# Patient Record
Sex: FEMALE | Race: BLACK OR AFRICAN AMERICAN | HISPANIC OR LATINO | URBAN - METROPOLITAN AREA
[De-identification: names, ages, dates, MRNs, and addresses within clinical notes are randomized per-mention and may not be internally consistent; named-entity substitution may affect disease eponyms.]

---

## 2018-09-08 ENCOUNTER — EMERGENCY (EMERGENCY)
Facility: HOSPITAL | Age: 23
LOS: 0 days | Discharge: HOME | End: 2018-09-08
Admitting: PHYSICIAN ASSISTANT

## 2018-09-08 VITALS
TEMPERATURE: 98 F | RESPIRATION RATE: 18 BRPM | OXYGEN SATURATION: 99 % | HEART RATE: 68 BPM | SYSTOLIC BLOOD PRESSURE: 122 MMHG | DIASTOLIC BLOOD PRESSURE: 79 MMHG

## 2018-09-08 DIAGNOSIS — J02.9 ACUTE PHARYNGITIS, UNSPECIFIED: ICD-10-CM

## 2018-09-08 DIAGNOSIS — F17.200 NICOTINE DEPENDENCE, UNSPECIFIED, UNCOMPLICATED: ICD-10-CM

## 2018-09-08 RX ORDER — IBUPROFEN 200 MG
1 TABLET ORAL
Qty: 15 | Refills: 0
Start: 2018-09-08 | End: 2018-09-12

## 2018-09-08 RX ORDER — IBUPROFEN 200 MG
600 TABLET ORAL ONCE
Qty: 0 | Refills: 0 | Status: COMPLETED | OUTPATIENT
Start: 2018-09-08 | End: 2018-09-08

## 2018-09-08 RX ADMIN — Medication 600 MILLIGRAM(S): at 15:38

## 2018-09-08 NOTE — ED PROVIDER NOTE - OBJECTIVE STATEMENT
3 days of cough, nasal congestion, subjective fever, chills. no SOB. Positive smoking. No recent travel or leg pain or OCP use

## 2019-01-24 PROBLEM — Z00.00 ENCOUNTER FOR PREVENTIVE HEALTH EXAMINATION: Status: ACTIVE | Noted: 2019-01-24

## 2019-01-29 ENCOUNTER — APPOINTMENT (OUTPATIENT)
Dept: ANTEPARTUM | Facility: CLINIC | Age: 24
End: 2019-01-29

## 2019-01-29 ENCOUNTER — OUTPATIENT (OUTPATIENT)
Dept: OUTPATIENT SERVICES | Facility: HOSPITAL | Age: 24
LOS: 1 days | Discharge: HOME | End: 2019-01-29

## 2019-01-29 DIAGNOSIS — O35.1XX1 MATERNAL CARE FOR (SUSPECTED) CHROMOSOMAL ABNORMALITY IN FETUS, FETUS 1: ICD-10-CM

## 2019-01-29 DIAGNOSIS — Z36.82 ENCOUNTER FOR ANTENATAL SCREENING FOR NUCHAL TRANSLUCENCY: ICD-10-CM

## 2019-01-29 DIAGNOSIS — Z3A.12 12 WEEKS GESTATION OF PREGNANCY: ICD-10-CM

## 2019-03-01 ENCOUNTER — EMERGENCY (EMERGENCY)
Facility: HOSPITAL | Age: 24
LOS: 0 days | Discharge: HOME | End: 2019-03-01
Attending: EMERGENCY MEDICINE | Admitting: EMERGENCY MEDICINE

## 2019-03-01 VITALS
HEART RATE: 86 BPM | RESPIRATION RATE: 18 BRPM | SYSTOLIC BLOOD PRESSURE: 116 MMHG | TEMPERATURE: 98 F | OXYGEN SATURATION: 98 % | DIASTOLIC BLOOD PRESSURE: 62 MMHG

## 2019-03-01 VITALS
TEMPERATURE: 99 F | DIASTOLIC BLOOD PRESSURE: 53 MMHG | OXYGEN SATURATION: 98 % | HEART RATE: 85 BPM | RESPIRATION RATE: 18 BRPM | SYSTOLIC BLOOD PRESSURE: 103 MMHG

## 2019-03-01 DIAGNOSIS — Z79.2 LONG TERM (CURRENT) USE OF ANTIBIOTICS: ICD-10-CM

## 2019-03-01 DIAGNOSIS — R11.2 NAUSEA WITH VOMITING, UNSPECIFIED: ICD-10-CM

## 2019-03-01 DIAGNOSIS — R50.9 FEVER, UNSPECIFIED: ICD-10-CM

## 2019-03-01 DIAGNOSIS — Z3A.17 17 WEEKS GESTATION OF PREGNANCY: ICD-10-CM

## 2019-03-01 DIAGNOSIS — R10.9 UNSPECIFIED ABDOMINAL PAIN: ICD-10-CM

## 2019-03-01 DIAGNOSIS — Z79.899 OTHER LONG TERM (CURRENT) DRUG THERAPY: ICD-10-CM

## 2019-03-01 DIAGNOSIS — O99.89 OTHER SPECIFIED DISEASES AND CONDITIONS COMPLICATING PREGNANCY, CHILDBIRTH AND THE PUERPERIUM: ICD-10-CM

## 2019-03-01 DIAGNOSIS — R19.7 DIARRHEA, UNSPECIFIED: ICD-10-CM

## 2019-03-01 LAB
ALBUMIN SERPL ELPH-MCNC: 3.9 G/DL — SIGNIFICANT CHANGE UP (ref 3.5–5.2)
ALP SERPL-CCNC: 57 U/L — SIGNIFICANT CHANGE UP (ref 30–115)
ALT FLD-CCNC: 14 U/L — SIGNIFICANT CHANGE UP (ref 0–41)
ANION GAP SERPL CALC-SCNC: 15 MMOL/L — HIGH (ref 7–14)
APPEARANCE UR: ABNORMAL
AST SERPL-CCNC: 18 U/L — SIGNIFICANT CHANGE UP (ref 0–41)
BACTERIA # UR AUTO: ABNORMAL /HPF
BASOPHILS # BLD AUTO: 0.02 K/UL — SIGNIFICANT CHANGE UP (ref 0–0.2)
BASOPHILS NFR BLD AUTO: 0.3 % — SIGNIFICANT CHANGE UP (ref 0–1)
BILIRUB SERPL-MCNC: 0.3 MG/DL — SIGNIFICANT CHANGE UP (ref 0.2–1.2)
BILIRUB UR-MCNC: ABNORMAL
BUN SERPL-MCNC: 7 MG/DL — LOW (ref 10–20)
CALCIUM SERPL-MCNC: 9.1 MG/DL — SIGNIFICANT CHANGE UP (ref 8.5–10.1)
CHLORIDE SERPL-SCNC: 98 MMOL/L — SIGNIFICANT CHANGE UP (ref 98–110)
CO2 SERPL-SCNC: 21 MMOL/L — SIGNIFICANT CHANGE UP (ref 17–32)
COLOR SPEC: SIGNIFICANT CHANGE UP
CREAT SERPL-MCNC: 0.6 MG/DL — LOW (ref 0.7–1.5)
DIFF PNL FLD: NEGATIVE — SIGNIFICANT CHANGE UP
EOSINOPHIL # BLD AUTO: 0 K/UL — SIGNIFICANT CHANGE UP (ref 0–0.7)
EOSINOPHIL NFR BLD AUTO: 0 % — SIGNIFICANT CHANGE UP (ref 0–8)
GLUCOSE SERPL-MCNC: 96 MG/DL — SIGNIFICANT CHANGE UP (ref 70–99)
GLUCOSE UR QL: NEGATIVE — SIGNIFICANT CHANGE UP
HCT VFR BLD CALC: 31.1 % — LOW (ref 37–47)
HGB BLD-MCNC: 10.6 G/DL — LOW (ref 12–16)
IMM GRANULOCYTES NFR BLD AUTO: 0.3 % — SIGNIFICANT CHANGE UP (ref 0.1–0.3)
KETONES UR-MCNC: >=80
LEUKOCYTE ESTERASE UR-ACNC: ABNORMAL
LYMPHOCYTES # BLD AUTO: 0.91 K/UL — LOW (ref 1.2–3.4)
LYMPHOCYTES # BLD AUTO: 15.7 % — LOW (ref 20.5–51.1)
MCHC RBC-ENTMCNC: 29.4 PG — SIGNIFICANT CHANGE UP (ref 27–31)
MCHC RBC-ENTMCNC: 34.1 G/DL — SIGNIFICANT CHANGE UP (ref 32–37)
MCV RBC AUTO: 86.4 FL — SIGNIFICANT CHANGE UP (ref 81–99)
MONOCYTES # BLD AUTO: 0.6 K/UL — SIGNIFICANT CHANGE UP (ref 0.1–0.6)
MONOCYTES NFR BLD AUTO: 10.3 % — HIGH (ref 1.7–9.3)
NEUTROPHILS # BLD AUTO: 4.25 K/UL — SIGNIFICANT CHANGE UP (ref 1.4–6.5)
NEUTROPHILS NFR BLD AUTO: 73.4 % — SIGNIFICANT CHANGE UP (ref 42.2–75.2)
NITRITE UR-MCNC: NEGATIVE — SIGNIFICANT CHANGE UP
NRBC # BLD: 0 /100 WBCS — SIGNIFICANT CHANGE UP (ref 0–0)
PH UR: 6.5 — SIGNIFICANT CHANGE UP (ref 5–8)
PLATELET # BLD AUTO: 229 K/UL — SIGNIFICANT CHANGE UP (ref 130–400)
POTASSIUM SERPL-MCNC: 3.5 MMOL/L — SIGNIFICANT CHANGE UP (ref 3.5–5)
POTASSIUM SERPL-SCNC: 3.5 MMOL/L — SIGNIFICANT CHANGE UP (ref 3.5–5)
PROT SERPL-MCNC: 7.9 G/DL — SIGNIFICANT CHANGE UP (ref 6–8)
PROT UR-MCNC: 100
RBC # BLD: 3.6 M/UL — LOW (ref 4.2–5.4)
RBC # FLD: 12.3 % — SIGNIFICANT CHANGE UP (ref 11.5–14.5)
SODIUM SERPL-SCNC: 134 MMOL/L — LOW (ref 135–146)
SP GR SPEC: >=1.03 — SIGNIFICANT CHANGE UP (ref 1.01–1.03)
UROBILINOGEN FLD QL: 1 (ref 0.2–0.2)
WBC # BLD: 5.8 K/UL — SIGNIFICANT CHANGE UP (ref 4.8–10.8)
WBC # FLD AUTO: 5.8 K/UL — SIGNIFICANT CHANGE UP (ref 4.8–10.8)
WBC UR QL: SIGNIFICANT CHANGE UP /HPF

## 2019-03-01 RX ORDER — ACETAMINOPHEN 500 MG
650 TABLET ORAL ONCE
Qty: 0 | Refills: 0 | Status: COMPLETED | OUTPATIENT
Start: 2019-03-01 | End: 2019-03-01

## 2019-03-01 RX ORDER — ONDANSETRON 8 MG/1
4 TABLET, FILM COATED ORAL ONCE
Qty: 0 | Refills: 0 | Status: COMPLETED | OUTPATIENT
Start: 2019-03-01 | End: 2019-03-01

## 2019-03-01 RX ORDER — SODIUM CHLORIDE 9 MG/ML
2000 INJECTION, SOLUTION INTRAVENOUS ONCE
Qty: 0 | Refills: 0 | Status: COMPLETED | OUTPATIENT
Start: 2019-03-01 | End: 2019-03-01

## 2019-03-01 RX ADMIN — Medication 650 MILLIGRAM(S): at 21:00

## 2019-03-01 RX ADMIN — ONDANSETRON 4 MILLIGRAM(S): 8 TABLET, FILM COATED ORAL at 18:17

## 2019-03-01 RX ADMIN — SODIUM CHLORIDE 2000 MILLILITER(S): 9 INJECTION, SOLUTION INTRAVENOUS at 18:17

## 2019-03-01 RX ADMIN — SODIUM CHLORIDE 2000 MILLILITER(S): 9 INJECTION, SOLUTION INTRAVENOUS at 19:30

## 2019-03-01 RX ADMIN — Medication 650 MILLIGRAM(S): at 20:19

## 2019-03-01 NOTE — ED PROVIDER NOTE - ATTENDING CONTRIBUTION TO CARE
I personally evaluated the patient. I reviewed the Resident’s or Physician Assistant’s note (as assigned above), and agree with the findings and plan except as documented in my note.  22 yo woman with myalgias, nasuea, vomiting and flank pain.  Workup in ED unremarkable.  Improved with IVf and medications. sTable for discharge and close outpatient follow up.

## 2019-03-01 NOTE — ED ADULT NURSE NOTE - NSIMPLEMENTINTERV_GEN_ALL_ED
Implemented All Universal Safety Interventions:  [place] to call system. Call bell, personal items and telephone within reach. Instruct patient to call for assistance. Room bathroom lighting operational. Non-slip footwear when patient is off stretcher. Physically safe environment: no spills, clutter or unnecessary equipment. Stretcher in lowest position, wheels locked, appropriate side rails in place.

## 2019-03-01 NOTE — ED PROVIDER NOTE - CLINICAL SUMMARY MEDICAL DECISION MAKING FREE TEXT BOX
24 yo woman with nausea vomitign and diarrhea.  Very well appearing.  Normal exam.  Labs ok.  Stable for discharge.

## 2019-03-01 NOTE — ED PROVIDER NOTE - PHYSICAL EXAMINATION
Con: Well appearing NAD non toxic.   HEENT: NCAT EOMI conjunctiva normal. No nasal discharge. dry MM  Neck: Supple, non tender, full ROM.   CV: RRR no MRG +S1S2.   Pulm: CTA b/l.   Abd: s NT ND +BS.   +R CVA tenderness  Ext: WWP x4, moving all extremities, no edema.   Psy: Cooperative, appropriate.   Skin: Warm, dry, no rash
normal

## 2019-03-01 NOTE — ED PROVIDER NOTE - NS ED ROS FT
Constitutional:  See HPI.   Eyes:  No visual changes, eye pain or discharge.  ENMT:  No hearing changes, pain, discharge or infections. No neck pain or stiffness.  Cardiac:  No chest pain, SOB or edema. No chest pain with exertion.  Respiratory:  No cough or respiratory distress. No hemoptysis.  GI:  No abdominal pain.  :  No dysuria, frequency, hematuria  MS:  No joint pain    Neuro:  No LOC. No headache or weakness.    Skin:  No skin rash.  Except as in HPI, all other review of systems is negative

## 2019-03-01 NOTE — ED PROVIDER NOTE - PROGRESS NOTE DETAILS
pt feeling improved, tolerating PO. Patient aware of all results, given a copy of all results, comfortable with discharge and follow-up outpatient, strict return precautions given. Patient endorses understanding of all of this and aware that they can return at any time for new or concerning symptoms. No further questions or concerns at this time

## 2019-03-01 NOTE — ED PROVIDER NOTE - NSFOLLOWUPINSTRUCTIONS_ED_ALL_ED_FT
Viral Gastroenteritis, Adult    Viral gastroenteritis is also known as the stomach flu. This condition is caused by certain germs (viruses). These germs can be passed from person to person very easily (are very contagious). This condition can cause sudden watery poop (diarrhea), fever, and throwing up (vomiting).    Having watery poop and throwing up can make you feel weak and cause you to get dehydrated. Dehydration can make you tired and thirsty, make you have a dry mouth, and make it so you pee (urinate) less often. Older adults and people with other diseases or a weak defense system (immune system) are at higher risk for dehydration. It is important to replace the fluids that you lose from having watery poop and throwing up.    HOME CARE  Follow instructions from your doctor about how to care for yourself at home.    Eating and Drinking    Follow these instructions as told by your doctor:    Take an oral rehydration solution (ORS). This is a drink that is sold at pharmacies and stores.   Drink clear fluids in small amounts as you are able, such as:  Water.  Ice chips.  Diluted fruit juice.  Low-calorie sports drinks.  Eat bland, easy-to-digest foods in small amounts as you are able, such as:  Bananas.  Applesauce.  Rice.  Low-fat (lean) meats.  Toast.  Crackers.  Avoid fluids that have a lot of sugar or caffeine in them, such as:  Energy drinks.  Sports drinks.  Soda.  Avoid alcohol.  Avoid spicy or fatty foods.    General Instructions    Drink enough fluid to keep your pee (urine) clear or pale yellow.  Wash your hands often. If you cannot use soap and water, use hand .  Make sure that all people in your home wash their hands well and often.  Rest at home while you get better.  Take over-the-counter and prescription medicines only as told by your doctor.  Watch your condition for any changes.  Take a warm bath to help with any burning or pain from having watery poop.  Keep all follow-up visits as told by your doctor. This is important.    GET HELP IF:  You cannot keep fluids down.  Your symptoms get worse.  You have new symptoms.  You feel light-headed or dizzy.  You have muscle cramps.    GET HELP RIGHT AWAY IF:  You have chest pain.  You feel very weak or you pass out (faint).  You see blood in your throw-up.  Your throw-up looks like coffee grounds.  You have bloody or black poop (stools) or poop that look like tar.  You have a very bad headache, a stiff neck, or both.  You have a rash.  You have very bad pain, cramping, or bloating in your belly (abdomen).  You have trouble breathing.  You are breathing very quickly.  Your heart is beating very quickly.  Your skin feels cold and clammy.  You feel confused.  You have pain when you pee.  You have signs of dehydration, such as:  Dark pee, hardly any pee, or no pee.  Cracked lips.  Dry mouth.  Sunken eyes.  Sleepiness.  Weakness.    ADDITIONAL NOTES AND INSTRUCTIONS    Please follow up with your Primary MD in 24-48 hr.  Seek immediate medical care for any new/worsening signs or symptoms.     Viral Respiratory Infection    A viral respiratory infection is an illness that affects parts of the body used for breathing, like the lungs, nose, and throat. It is caused by a germ called a virus. Symptoms can include runny nose, coughing, sneezing, fatigue, body aches, sore throat, fever, or headache. Over the counter medicine can be used to manage the symptoms but the infection itself goes away on its own.     SEEK IMMEDIATE MEDICAL CARE IF YOU HAVE THE FOLLOWING SYMPTOMS: shortness of breath, chest pain, fever over 10 days, lightheadedness/dizziness.

## 2019-03-03 LAB
CULTURE RESULTS: SIGNIFICANT CHANGE UP
SPECIMEN SOURCE: SIGNIFICANT CHANGE UP

## 2019-03-04 ENCOUNTER — OUTPATIENT (OUTPATIENT)
Dept: OUTPATIENT SERVICES | Facility: HOSPITAL | Age: 24
LOS: 1 days | Discharge: HOME | End: 2019-03-04

## 2019-03-04 DIAGNOSIS — Z00.00 ENCOUNTER FOR GENERAL ADULT MEDICAL EXAMINATION WITHOUT ABNORMAL FINDINGS: ICD-10-CM

## 2019-03-19 ENCOUNTER — OUTPATIENT (OUTPATIENT)
Dept: OUTPATIENT SERVICES | Facility: HOSPITAL | Age: 24
LOS: 1 days | Discharge: HOME | End: 2019-03-19

## 2019-03-19 ENCOUNTER — APPOINTMENT (OUTPATIENT)
Dept: ANTEPARTUM | Facility: CLINIC | Age: 24
End: 2019-03-19

## 2019-04-02 ENCOUNTER — OUTPATIENT (OUTPATIENT)
Dept: OUTPATIENT SERVICES | Facility: HOSPITAL | Age: 24
LOS: 1 days | Discharge: HOME | End: 2019-04-02

## 2019-04-02 ENCOUNTER — APPOINTMENT (OUTPATIENT)
Dept: ANTEPARTUM | Facility: CLINIC | Age: 24
End: 2019-04-02

## 2021-04-13 ENCOUNTER — ASOB RESULT (OUTPATIENT)
Age: 26
End: 2021-04-13

## 2021-04-13 ENCOUNTER — APPOINTMENT (OUTPATIENT)
Dept: ANTEPARTUM | Facility: CLINIC | Age: 26
End: 2021-04-13
Payer: MEDICAID

## 2021-04-13 ENCOUNTER — OUTPATIENT (OUTPATIENT)
Dept: OUTPATIENT SERVICES | Facility: HOSPITAL | Age: 26
LOS: 1 days | Discharge: HOME | End: 2021-04-13

## 2021-04-13 ENCOUNTER — APPOINTMENT (OUTPATIENT)
Dept: OBGYN | Facility: CLINIC | Age: 26
End: 2021-04-13

## 2021-04-13 PROCEDURE — 76819 FETAL BIOPHYS PROFIL W/O NST: CPT | Mod: 26

## 2021-04-13 PROCEDURE — 76805 OB US >/= 14 WKS SNGL FETUS: CPT | Mod: 26

## 2021-05-03 ENCOUNTER — OUTPATIENT (OUTPATIENT)
Dept: OUTPATIENT SERVICES | Facility: HOSPITAL | Age: 26
LOS: 1 days | Discharge: HOME | End: 2021-05-03
Payer: MEDICAID

## 2021-05-03 VITALS — HEART RATE: 85 BPM | DIASTOLIC BLOOD PRESSURE: 76 MMHG | SYSTOLIC BLOOD PRESSURE: 118 MMHG

## 2021-05-03 VITALS
SYSTOLIC BLOOD PRESSURE: 118 MMHG | HEART RATE: 85 BPM | DIASTOLIC BLOOD PRESSURE: 76 MMHG | TEMPERATURE: 98 F | RESPIRATION RATE: 16 BRPM

## 2021-05-03 DIAGNOSIS — O36.93X0 MATERNAL CARE FOR FETAL PROBLEM, UNSPECIFIED, THIRD TRIMESTER, NOT APPLICABLE OR UNSPECIFIED: ICD-10-CM

## 2021-05-03 DIAGNOSIS — O26.893 OTHER SPECIFIED PREGNANCY RELATED CONDITIONS, THIRD TRIMESTER: ICD-10-CM

## 2021-05-03 DIAGNOSIS — Z90.49 ACQUIRED ABSENCE OF OTHER SPECIFIED PARTS OF DIGESTIVE TRACT: Chronic | ICD-10-CM

## 2021-05-03 PROCEDURE — 99213 OFFICE O/P EST LOW 20 MIN: CPT | Mod: 25

## 2021-05-03 PROCEDURE — 76815 OB US LIMITED FETUS(S): CPT | Mod: 26

## 2021-05-03 PROCEDURE — 76818 FETAL BIOPHYS PROFILE W/NST: CPT | Mod: 26

## 2021-05-03 RX ORDER — ONDANSETRON 8 MG/1
4 TABLET, FILM COATED ORAL ONCE
Refills: 0 | Status: COMPLETED | OUTPATIENT
Start: 2021-05-03 | End: 2021-05-03

## 2021-05-03 RX ORDER — AMPICILLIN TRIHYDRATE 250 MG
2 CAPSULE ORAL ONCE
Refills: 0 | Status: COMPLETED | OUTPATIENT
Start: 2021-05-03 | End: 2021-05-03

## 2021-05-03 RX ADMIN — ONDANSETRON 4 MILLIGRAM(S): 8 TABLET, FILM COATED ORAL at 13:10

## 2021-05-03 RX ADMIN — Medication 12 MILLIGRAM(S): at 13:02

## 2021-05-03 RX ADMIN — Medication 216 GRAM(S): at 13:01

## 2021-05-03 NOTE — OB PROVIDER TRIAGE NOTE - HISTORY OF PRESENT ILLNESS
26yo  @35w4d by 3rd trimester sonogram, late transfer with limited prenatal care, presented to L&D after feeling leakage of fluid at 0900 today. She reports brown mucus discharge and leakage of clear fluid that soaked a towel that she had between her legs. Denies vaginal bleeding. Reports good fetal movement. Denies complications during this pregnancy. GBS unknown.  26yo  @35w4d by 3rd trimester sonogram, late transfer with limited prenatal care, presented to L&D after feeling leakage of fluid at 0900 today. She reports brown mucus discharge and leakage of clear fluid that soaked a towel that she had between her legs. Contractions began at 0900, felt every 3 min, 8/10 in intensity. Denies vaginal bleeding. Reports good fetal movement. Denies complications during this pregnancy. GBS unknown.  24yo  @35w4d by 3rd trimester sonogram, late registrant with limited prenatal care, presented to L&D after feeling leakage of fluid at 0900 today. She reports brown mucus discharge and leakage of clear fluid that soaked a towel that she had between her legs. Contractions began at 0900, felt every 3 min, 8/10 in intensity. Denies vaginal bleeding. Reports good fetal movement. Denies complications during this pregnancy. GBS unknown.

## 2021-05-03 NOTE — OB PROVIDER TRIAGE NOTE - ADDITIONAL INSTRUCTIONS
Pt to ambulate x 2-3 hours and return to L&D for reevaluation  Increase fluid intake  Dr. Zayas aware rule out  labor

## 2021-05-03 NOTE — OB PROVIDER TRIAGE NOTE - NSHPPHYSICALEXAM_GEN_ALL_CORE
T(C): 36.7 (05-03-21 @ 10:29), Max: 36.7 (05-03-21 @ 10:29)  T(F): 98.1 (05-03-21 @ 10:29), Max: 98.1 (05-03-21 @ 10:29)  HR: 85 (05-03-21 @ 10:33) (85 - 85)  BP: 118/76 (05-03-21 @ 10:33) (118/76 - 118/76)  RR: 16 (05-03-21 @ 10:29) (16 - 16)    Gen: AOx3, NAD  Abd: soft, gravid, nontender, no palpable contractions  Ext: no swelling  Speculum: no pooling, nitrazine positive, ferning negative  Bedside sono: cephalic, MVP 4.26cm, BPP 8/8, anterior placenta    EFM: 125/mod angela/+accels   [place]: irregular  SVE: 2/50/-3/vtx

## 2021-05-03 NOTE — OB PROVIDER TRIAGE NOTE - NS_OBGYNHISTORY_OBGYN_ALL_OB_FT
OB Hx:     x6 all delivered at around 36w, largest approx 5lbs, denies complications  36w LTCS x1 (second pregnancy) for NRFHR  eTOPx1, no D&C    GYN Hx:  Denies h/o ovarian cysts, fibroids, STIs, or abnormal pap smears.

## 2021-05-03 NOTE — OB PROVIDER TRIAGE NOTE - PLAN OF CARE
Healthy pregnancy Halthy pregnancy Discharge to home  Pt to return in 2-3hours for reevaluation  Return sooner if pain worsens, Loss of fluid or VB  Increase fluid intake  Dr. Stoner/ Dr. Zayas aware

## 2021-05-03 NOTE — OB PROVIDER TRIAGE NOTE - NSHPLABSRESULTS_GEN_ALL_CORE
No labs available at this time     Sonograms:  4/13/21: EGA 32w5d, FOREST 6/3/21, cephalic, anterior placenta, MVP 3.86cm, BPP 8/8, EFW 1907g (23%), fetal anatomy incompletely visualized due to advanced gestational age and fetal position, however, no major malformations visualized.

## 2021-05-03 NOTE — OB PROVIDER TRIAGE NOTE - NSOBPROVIDERNOTE_OBGYN_ALL_OB_FT
24yo  @35w4d by 3rd trimester sono, late transfer with limited prenatal care, membranes intact and not in labor.  - PO hydration encouraged  - f/u GBS  - f/u outpatient  - labor precautions      and  aware 26yo  @35w4d by 3rd trimester sono, late transfer with limited prenatal care, here for r/o rupture of membranes.   - PO hydration encouraged  - f/u GBS  - labor precautions      and  aware 24yo  @35w4d by 3rd trimester sono, late transfer with limited prenatal care, here for r/o rupture of membranes.   - PO hydration encouraged  - f/u GBS  - labor precautions      and  aware    Attending note:  patient seen and examined  35 weeks for r/o rupture; membranes intact and pt not in labor  membranes confirmed intact; negative ferning, membranes palpated on exam, adequate MVP  exam 2/50/-3, intact  EFM reactive  betamethasone given for  contractions, risk of  delivery given history of  deliveries  precautions given to return 24yo  @35w4d by 3rd trimester sono, late registrant with limited prenatal care, here for r/o rupture of membranes.   - PO hydration encouraged  - f/u GBS  - labor precautions      and  aware    Attending note:  patient seen and examined  35 weeks for r/o rupture; membranes intact and pt not in labor  membranes confirmed intact; negative ferning, membranes palpated on exam, adequate MVP  exam 2/50/-3, intact  EFM reactive  betamethasone given for  contractions, risk of  delivery given history of  deliveries  precautions given to return

## 2021-05-04 ENCOUNTER — OUTPATIENT (OUTPATIENT)
Dept: OUTPATIENT SERVICES | Facility: HOSPITAL | Age: 26
LOS: 1 days | Discharge: HOME | End: 2021-05-04
Payer: MEDICAID

## 2021-05-04 VITALS — SYSTOLIC BLOOD PRESSURE: 122 MMHG | DIASTOLIC BLOOD PRESSURE: 68 MMHG | HEART RATE: 71 BPM

## 2021-05-04 VITALS — SYSTOLIC BLOOD PRESSURE: 120 MMHG | DIASTOLIC BLOOD PRESSURE: 63 MMHG | HEART RATE: 78 BPM

## 2021-05-04 DIAGNOSIS — Z90.49 ACQUIRED ABSENCE OF OTHER SPECIFIED PARTS OF DIGESTIVE TRACT: Chronic | ICD-10-CM

## 2021-05-04 PROBLEM — D64.9 ANEMIA, UNSPECIFIED: Chronic | Status: ACTIVE | Noted: 2021-05-03

## 2021-05-04 LAB
AMPHET UR-MCNC: NEGATIVE — SIGNIFICANT CHANGE UP
APPEARANCE UR: ABNORMAL
BACTERIA # UR AUTO: NEGATIVE — SIGNIFICANT CHANGE UP
BARBITURATES UR SCN-MCNC: NEGATIVE — SIGNIFICANT CHANGE UP
BASOPHILS # BLD AUTO: 0.01 K/UL — SIGNIFICANT CHANGE UP (ref 0–0.2)
BASOPHILS NFR BLD AUTO: 0.1 % — SIGNIFICANT CHANGE UP (ref 0–1)
BENZODIAZ UR-MCNC: NEGATIVE — SIGNIFICANT CHANGE UP
BILIRUB UR-MCNC: NEGATIVE — SIGNIFICANT CHANGE UP
BLD GP AB SCN SERPL QL: SIGNIFICANT CHANGE UP
BUPRENORPHINE SCREEN, URINE RESULT: NEGATIVE — SIGNIFICANT CHANGE UP
COCAINE METAB.OTHER UR-MCNC: NEGATIVE — SIGNIFICANT CHANGE UP
COLOR SPEC: YELLOW — SIGNIFICANT CHANGE UP
DIFF PNL FLD: NEGATIVE — SIGNIFICANT CHANGE UP
EOSINOPHIL # BLD AUTO: 0 K/UL — SIGNIFICANT CHANGE UP (ref 0–0.7)
EOSINOPHIL NFR BLD AUTO: 0 % — SIGNIFICANT CHANGE UP (ref 0–8)
EPI CELLS # UR: 11 /HPF — HIGH (ref 0–5)
FENTANYL UR QL: NEGATIVE — SIGNIFICANT CHANGE UP
GLUCOSE UR QL: NEGATIVE — SIGNIFICANT CHANGE UP
HCT VFR BLD CALC: 30.3 % — LOW (ref 37–47)
HGB BLD-MCNC: 10.4 G/DL — LOW (ref 12–16)
HYALINE CASTS # UR AUTO: 14 /LPF — HIGH (ref 0–7)
IMM GRANULOCYTES NFR BLD AUTO: 0.6 % — HIGH (ref 0.1–0.3)
KETONES UR-MCNC: ABNORMAL
L&D DRUG SCREEN, URINE: SIGNIFICANT CHANGE UP
LEUKOCYTE ESTERASE UR-ACNC: ABNORMAL
LYMPHOCYTES # BLD AUTO: 19.9 % — LOW (ref 20.5–51.1)
LYMPHOCYTES # BLD AUTO: 2.01 K/UL — SIGNIFICANT CHANGE UP (ref 1.2–3.4)
MCHC RBC-ENTMCNC: 30.2 PG — SIGNIFICANT CHANGE UP (ref 27–31)
MCHC RBC-ENTMCNC: 34.3 G/DL — SIGNIFICANT CHANGE UP (ref 32–37)
MCV RBC AUTO: 88.1 FL — SIGNIFICANT CHANGE UP (ref 81–99)
METHADONE UR-MCNC: NEGATIVE — SIGNIFICANT CHANGE UP
MONOCYTES # BLD AUTO: 0.47 K/UL — SIGNIFICANT CHANGE UP (ref 0.1–0.6)
MONOCYTES NFR BLD AUTO: 4.7 % — SIGNIFICANT CHANGE UP (ref 1.7–9.3)
NEUTROPHILS # BLD AUTO: 7.54 K/UL — HIGH (ref 1.4–6.5)
NEUTROPHILS NFR BLD AUTO: 74.7 % — SIGNIFICANT CHANGE UP (ref 42.2–75.2)
NITRITE UR-MCNC: NEGATIVE — SIGNIFICANT CHANGE UP
NRBC # BLD: 0 /100 WBCS — SIGNIFICANT CHANGE UP (ref 0–0)
OPIATES UR-MCNC: NEGATIVE — SIGNIFICANT CHANGE UP
OXYCODONE UR-MCNC: NEGATIVE — SIGNIFICANT CHANGE UP
PCP UR-MCNC: NEGATIVE — SIGNIFICANT CHANGE UP
PH UR: 6.5 — SIGNIFICANT CHANGE UP (ref 5–8)
PLATELET # BLD AUTO: 214 K/UL — SIGNIFICANT CHANGE UP (ref 130–400)
PRENATAL SYPHILIS TEST: SIGNIFICANT CHANGE UP
PROPOXYPHENE QUALITATIVE URINE RESULT: NEGATIVE — SIGNIFICANT CHANGE UP
PROT UR-MCNC: ABNORMAL
RBC # BLD: 3.44 M/UL — LOW (ref 4.2–5.4)
RBC # FLD: 12.1 % — SIGNIFICANT CHANGE UP (ref 11.5–14.5)
RBC CASTS # UR COMP ASSIST: 3 /HPF — SIGNIFICANT CHANGE UP (ref 0–4)
SARS-COV-2 RNA SPEC QL NAA+PROBE: SIGNIFICANT CHANGE UP
SP GR SPEC: 1.02 — SIGNIFICANT CHANGE UP (ref 1.01–1.03)
UROBILINOGEN FLD QL: ABNORMAL
WBC # BLD: 10.09 K/UL — SIGNIFICANT CHANGE UP (ref 4.8–10.8)
WBC # FLD AUTO: 10.09 K/UL — SIGNIFICANT CHANGE UP (ref 4.8–10.8)
WBC UR QL: 16 /HPF — HIGH (ref 0–5)

## 2021-05-04 PROCEDURE — 76815 OB US LIMITED FETUS(S): CPT | Mod: 26

## 2021-05-04 PROCEDURE — 99212 OFFICE O/P EST SF 10 MIN: CPT | Mod: 25

## 2021-05-04 PROCEDURE — 76818 FETAL BIOPHYS PROFILE W/NST: CPT | Mod: 26

## 2021-05-04 RX ORDER — AMPICILLIN TRIHYDRATE 250 MG
1 CAPSULE ORAL EVERY 4 HOURS
Refills: 0 | Status: DISCONTINUED | OUTPATIENT
Start: 2021-05-04 | End: 2021-05-18

## 2021-05-04 RX ORDER — FAMOTIDINE 10 MG/ML
20 INJECTION INTRAVENOUS DAILY
Refills: 0 | Status: DISCONTINUED | OUTPATIENT
Start: 2021-05-04 | End: 2021-05-18

## 2021-05-04 RX ORDER — FAMOTIDINE 10 MG/ML
20 INJECTION INTRAVENOUS ONCE
Refills: 0 | Status: COMPLETED | OUTPATIENT
Start: 2021-05-04 | End: 2021-05-04

## 2021-05-04 RX ORDER — AMPICILLIN TRIHYDRATE 250 MG
2 CAPSULE ORAL ONCE
Refills: 0 | Status: COMPLETED | OUTPATIENT
Start: 2021-05-04 | End: 2021-05-04

## 2021-05-04 RX ADMIN — Medication 12 MILLIGRAM(S): at 12:53

## 2021-05-04 RX ADMIN — FAMOTIDINE 20 MILLIGRAM(S): 10 INJECTION INTRAVENOUS at 13:02

## 2021-05-04 RX ADMIN — Medication 216 GRAM(S): at 12:54

## 2021-05-04 NOTE — CHART NOTE - NSCHARTNOTEFT_GEN_A_CORE
PGY 1 note    Patient seen and evaluated at bedside, she continues to endorse contraction pain, 9/10 in intensity, denies LOF, vaginal bleeding. Reports good fetal movement. SVE at this time remain unchanged at 4/50/-3. For prolonged monitoring.    EFM: 120/mod variability/accels +  [place]: irregular  SVE: 4/50/-3 @1400    medications:   celestone #2 @1253  ampicillin @1254

## 2021-05-04 NOTE — OB PROVIDER TRIAGE NOTE - NS_ABDFINDING_OBGYN_ALL_OB
[facility] [place] ([facility])    10/04/17       History     Chief Complaint   Patient presents with     Alcohol Intoxication     blew .368. Pt was brought in by EMS Art. [facility].Fire Dept came & found pt barely coherent and SOB.Pt was brought in and pt said she can't remember what happened & as to why she is here.     The history is provided by the patient and medical records.     Page Goerges is a 37-year-old female history significant for alcohol dependence with multiple ED visits for acute intoxication as well as further history significant for PTSD and anxiety. The patient arrives to the emergency department via EMS due to public intoxication at a local eating establishment.  Upon arrival, patient states she does not recall being intoxicated at this business.  She states she s been drinking heavily and been through treatment approximation 13 times. She denies knowledge of specific alcohol intake today.  She denies other illicit drug use.  She denies headache, recent trauma, or assault.  She reports no shortness of breath, chest pain, palpitations.  She denies recent nausea, vomiting, diarrhea.  She denies use of non-prescribed medications.    This part of the document was transcribed by Riccardo Caal, Medical Scribe.    I have reviewed the Medications, Allergies, Past Medical and Surgical History, and Social History in the Epic system.    Review of Systems   Constitutional: Negative for chills and fever.   Respiratory: Negative for chest tightness and shortness of breath.    Cardiovascular: Negative for chest pain and palpitations.   Genitourinary: Negative for dysuria and frequency.   Neurological: Negative for headaches.       Physical Exam   BP: 122/55  Heart Rate: 89  Temp: 98.7  F (37.1  C)  Resp: 16  SpO2: 93 %  Physical Exam   Constitutional: She is oriented to person, place, and time. She appears well-nourished. No distress.    HENT:   Head: Normocephalic and atraumatic.   Mouth/Throat: Oropharynx is clear and moist.   Eyes: Pupils are equal, round, and reactive to light.   Cardiovascular: Normal rate, regular rhythm and normal heart sounds.    Pulmonary/Chest: Breath sounds normal. No respiratory distress. She has no wheezes. She has no rales.   Abdominal: Soft. She exhibits no distension. There is no tenderness. There is no rebound.   Musculoskeletal: She exhibits no edema.   Neurological: She is alert and oriented to person, place, and time. No cranial nerve deficit.   Skin: Skin is warm and dry.   Psychiatric: Her speech is slurred. She expresses no homicidal and no suicidal ideation.       ED Course     ED Course     Procedures             Labs Ordered and Resulted from Time of ED Arrival Up to the Time of Departure from the ED   ALCOHOL BREATH TEST POCT - Abnormal; Notable for the following:        Result Value    Alcohol Breath Test 0.368 (*)     All other components within normal limits   DRUG ABUSE SCREEN 6 CHEM DEP URINE ([facility])   HCG QUALITATIVE URINE     Medications - No data to display     Assessments & Plan (with Medical Decision Making)   37-year-old female with history of alcohol dependence, withdrawal, major depressive disorder, and PTSD.  She now arrives to the emergency department acutely intoxicated.  On my evaluation, the patient is alert, she s currently afebrile and hemodynamically stable.  She is slurring her speech consistent with acute alcohol intoxication. She has no external signs of traumatic injury or recent fall and denies headache or trauma.  At this time, I do not believe she requires CT imaging of the head. She does admit to heavy alcohol intake, which is consistent with her toxidrome. No other specific toxicity noted.  She is speaking in full sentences with a sPO2 in the mid 90s on room air with no signs of increased work of breathing.  Pulse 89 and regular, no obvious signs of withdrawal from other  illicit substances.  Breathalyzer upon arrival greater than 0.300.  The patient is standing unassisted and tolerating PO fluids and foods.  She has declined offer for detox stating she can stay at her parents home.    Ultimately the patient has cleared clinically. She is alert. She denies SI or active mental health issues. She states she may safely return home to her parents  house.  At this time she requests public transportation but Breathalyzer remains near 0.200.  I discussed that I prefer this to be near 0.100 should she prefer public transportation or she may return to home with a responsible adult.  At this time she has nobody to pick her up.  She ll be observed in the Emergency Department for another several hours with repeat breathalyzer and then dismissal to home.    This part of the document was transcribed by Riccardo Caal and Yu Mclean, Medical Scribes.    I have reviewed the nursing notes.    I have reviewed the findings, diagnosis, plan and need for follow up with the patient.    New Prescriptions    No medications on file       Final diagnoses:   Alcoholic intoxication without complication (H)       10/4/2017   [facility], [place], EMERGENCY DEPARTMENT     Memo Briggs MD  10/05/17 0041     Soft, nontender

## 2021-05-04 NOTE — OB PROVIDER TRIAGE NOTE - NSOBPROVIDERNOTE_OBGYN_ALL_OB_FT
24yo  @35w4d by 3rd trimester sono, late registrant with limited prenatal care, here for 2nd dose of celestone, BPP , with membranes still intact, now in  labor.  - reevaluate in few hours to evaluate for cervical change  - IV fluid hydration  - f/u cbc, T&S, RPR  - f/u UA, UCx  - celestone 12mg IM (dose #2)  - continuous EFM/toco      and  aware 26yo  @35w4d by 3rd trimester sono, late registrant with limited prenatal care, here for 2nd dose of celestone, BPP , with membranes still intact, now in  labor.  - reevaluate in few hours to evaluate for cervical change  - IV fluid hydration  - ampicillin for GBS ppx  - celestone 12mg IM (dose #2)  - f/u cbc, T&S, RPR  - f/u UA, UCx  - continuous EFM/toco      and  aware 26yo  @35w5d by 3rd trimester sono, late registrant with limited prenatal care, here for 2nd dose of celestone, BPP , with membranes still intact, now in  labor.  - reevaluate in few hours to evaluate for cervical change  - IV fluid hydration  - ampicillin for GBS ppx  - celestone 12mg IM (dose #2)  - f/u cbc, T&S, RPR  - f/u UA, UCx  - continuous EFM/toco      and  aware 24yo  @35w5d by 3rd trimester sono, late registrant with limited prenatal care, here for 2nd dose of celestone, BPP /, with membranes still intact, now in  labor.  - reevaluate in few hours to evaluate for cervical change  - IV fluid hydration  - ampicillin for GBS ppx  - celestone 12mg IM (dose #2)  - f/u cbc, T&S, RPR  - f/u UA, UCx  - continuous EFM/toco     I was physically present for the key portions of the evaluation and management (E/M) service provided. I personally had a face to face encounter with the patient. I agree with the above history, physical and plan which I have reviewed and edited where appropriate.       and  aware 24yo  @35w5d by 3rd trimester miesha, late registrant with limited prenatal care, here for 2nd dose of celestone, BPP /, with membranes still intact, now in  labor.  - reevaluate in few hours to evaluate for cervical change  - IV fluid hydration  - ampicillin for GBS ppx  - celestone 12mg IM (dose #2)  - f/u cbc, T&S, RPR  - f/u UA, UCx  - continuous EFM/toco     I was physically present for the key portions of the evaluation and management (E/M) service provided. I personally had a face to face encounter with the patient. I agree with the above history, physical and plan which I have reviewed and edited where appropriate.      I was physically present for the key portions of the evaluation and management (E/M) service provided. I personally had a face to face encounter with the patient. I agree with the above history, physical and plan which I have reviewed and edited where appropriate.       and  aware 26yo  @35w5d by 3rd trimester miesha, late registrant with limited prenatal care, here for 2nd dose of celestone, BPP /, with membranes still intact, now in  labor.  - reevaluate in few hours to evaluate for cervical change  - IV fluid hydration  - ampicillin for GBS ppx  - celestone 12mg IM (dose #2)  - f/u cbc, T&S, RPR  - f/u UA, UCx  - continuous EFM/toco     I was physically present for the key portions of the evaluation and management (E/M) service provided. I personally had a face to face encounter with the patient. I agree with the above history, physical and plan which I have reviewed and edited where appropriate.      I was physically present for the key portions of the evaluation and management (E/M) service provided. I personally had a face to face encounter with the patient. I agree with the above history, physical and plan which I have reviewed and edited where appropriate.    ---  ADDENDUM:   Patient had unchanged SVE @1615. S/p course of celestone, s/p ampicillin. Likely in latent labor. Discussed with patient option of continued monitoring with reevaluation tomorrow AM. Patient refused to stay and expressed desire to go home and walk around. Provided excellent labor precautions and to return immediately to L&D.   - f/u GBS, UCx  - PO hydration encouraged     and  aware

## 2021-05-04 NOTE — OB RN TRIAGE NOTE - NSOBDISCHARGEVS_OBGYN_ALL_OB
I reviewed the H&P, I examined the patient, and there are no changes in the patient's condition.   Confirmed that patient received an additional set of vital signs prior to discharge.

## 2021-05-04 NOTE — OB PROVIDER TRIAGE NOTE - NSHPLABSRESULTS_GEN_ALL_CORE
No labs available at this time     Sonograms:  4/13/21: EGA 32w5d, FOREST 6/3/21, cephalic, anterior placenta, MVP 3.86cm, BPP 8/8, EFW 1907g (23%), fetal anatomy incompletely visualized due to advanced gestational age and fetal position, however, no major malformations visualized. 3/30/21  Type and Screen: A pos  Antibody screen: neg   Rubella: immune   RPR: neg  HbSAg: neg  HIV: NR    Sonograms:  4/13/21: EGA 32w5d, FOREST 6/3/21, cephalic, anterior placenta, MVP 3.86cm, BPP 8/8, EFW 1907g (23%), fetal anatomy incompletely visualized due to advanced gestational age and fetal position, however, no major malformations visualized.

## 2021-05-04 NOTE — OB PROVIDER TRIAGE NOTE - NS_EDDCALCULATED_OBGYN_ALL_OB
Recorded as Task  Date: 07/28/2017 11:35 AM, Created By: Chani Burnett  Task Name: 3. Referral Request  Assigned To: Angela Mendieta  Regarding Patient: ERIC COPPOLA, Status: In Progress  Comment:   Chani Burnett - 28 Jul 2017 11:35 AM    Referral Request  Referral Request:    IS THIS A NEW REQUEST?: Yes      REFERRAL ORDERED BY: Patient      INSURANCE TYPE: Medicare      REASON FOR REFERRAL: Mammogram Screening at [facility]   Please mail to patient at [address] [place] [phone]    Preferred Delivery Method:             CALL WHEN READY FOR  PICKUP:  [phone]            MAIL TO HOME:  (NO or YES- confirm address correct  in IDX)__           CALLER'S RELATIONSHIP TO PATIENT:    Patient         IF OTHER, NAME AND RELATIONSHIP:                                                               BEST NUMBER TO BE CONTACTED:  [phone] ok to leave a message        ALTERNATIVE PHONE NUMBER:      Turnaround time given to caller:          \"THIS MESSAGE WILL BE SENT TO YOUR PROVIDER, THE CLINICAL TEAM WILL RETURN YOUR CALL AS SOON AS THEY HAVE REVIEWED YOUR MESSAGE\"       READ BACK MESSAGE TO PATIENT  Jennie Delaney - 31 Jul 2017 11:18 AM    UNDELEGATED TASK  AARON HERBERT - 31 Jul 2017 6:39 PM    TASK REASSIGNED: Previously Assigned To AMNA QUIGLEY  ok  Angela Mendieta - 01 Aug 2017 5:15 PM    TASK IN PROGRESS  Angela Mendieta - 02 Aug 2017 11:10 AM    TASK REPLIED TO: Previously Assigned To Angela Mendieta  [facility] for patient for call back. Called [place] and last mammogram was done in 2016.  AMNA QUIGLEY - 02 Aug 2017 11:24 AM    TASK EDITED  please write patient referral for screening mammogram on a regular prescription pad  Angela Mendieta - 02 Aug 2017 1:25 PM    TASK REPLIED TO: Previously Assigned To Angela Mendieta  Script written out and mailed out to patient.      Electronically signed by:Angela Mendieta   Aug  2 2017  1:26PM CST    
Third Trimester Sonogram

## 2021-05-04 NOTE — OB PROVIDER TRIAGE NOTE - NSHPPHYSICALEXAM_GEN_ALL_CORE
Vital Signs Last 24 Hrs  T(C): 36 (04 May 2021 11:42), Max: 36 (04 May 2021 11:42)  T(F): 96.8 (04 May 2021 11:42), Max: 96.8 (04 May 2021 11:42)  HR: 75 (04 May 2021 13:58) (70 - 78)  BP: 115/69 (04 May 2021 13:58) (115/69 - 130/69)  RR: 20 (04 May 2021 11:42) (20 - 20)    Gen: AOx3, NAD  Abd: soft, gravid, nontender, (palpable contractions)  Ext: no swelling  Speculum: negative pooling, no vaginal bleeding, mucus noted   Bedside sono: cephalic, MVP 5.8cm, anterior placenta, BPP 8/8    EFM: 135/mod angela/+accels  [place]: irregular   SVE: 4/50/-3/vtx

## 2021-05-04 NOTE — PROGRESS NOTE ADULT - ASSESSMENT
24yo  @35w5d by 3rd trimester sono, late registrant with limited prenatal care, s/p course of celestone, s/p ampicillin, with unchanged cervical exam, likely in latent labor.   -Continue current management   -Pain management prn  -Continuous EFM/toco  -F/u pending labs (GBS, Ucx)      and  aware        26yo  @35w5d by 3rd trimester sono, late registrant with limited prenatal care, s/p course of celestone, s/p ampicillin, with unchanged cervical exam, likely in latent labor.   - Discussed with patient option of continued monitoring with reevaluation tomorrow AM. Patient refused to stay and expressed desire to go home and walk around. Provided excellent labor precautions and to return immediately to L&D.   - prepare discharge  -F/u pending labs (GBS, Ucx)      and  aware        26yo  @35w5d by 3rd trimester sono, late registrant with limited prenatal care, s/p course of celestone, s/p ampicillin, with unchanged cervical exam, likely in latent labor.   - Discussed with patient option of continued monitoring with reevaluation tomorrow AM. Patient refused to stay and expressed desire to go home and walk around. Provided excellent labor precautions and told to return immediately to L&D.   - prepare discharge  -F/u pending labs (GBS, Ucx)      and  aware

## 2021-05-04 NOTE — OB PROVIDER TRIAGE NOTE - HISTORY OF PRESENT ILLNESS
Reports continued contractions every 3-5 min, increased intensity from yesterday to 9/10 in intensity. Reports feeling continued leakage with passage of brown mucus discharge.  Denies vaginal bleeding. Reports good fetal movement. GBS unknown.  26yo  @35w4d by 3rd trimester sonogram, late registrant with limited prenatal care, presented to L&D for 2nd dose of celestone. Patient was evaluated in triage yesterday for possible rupture of membranes and SVE was 2/50/-3, MVP 4cm, nitrazine positive, ferning neg, membranes felt on exam. Patient was given dose of ampicillin and celestone yesterday  @1300. Today patient reports continued contractions every 3-5 min, increased intensity from yesterday to 9/10 in intensity. Reports feeling continued leakage with passage of brown mucus discharge. Denies vaginal bleeding. Reports good fetal movement. GBS unknown.  24yo  @35w5d by 3rd trimester sonogram, late registrant with limited prenatal care, presented to L&D for 2nd dose of celestone. Patient was evaluated in triage yesterday for possible rupture of membranes and SVE was 2/50/-3, MVP 4cm, nitrazine positive, ferning neg, membranes felt on exam. Patient was given dose of ampicillin and celestone yesterday  @1300. Today patient reports continued contractions every 3-5 min, increased intensity from yesterday to 10 in intensity. Reports feeling continued leakage with passage of brown mucus discharge. Denies vaginal bleeding. Reports good fetal movement. GBS unknown.

## 2021-05-04 NOTE — PROGRESS NOTE ADULT - SUBJECTIVE AND OBJECTIVE BOX
PGY1 Note    Patient seen at bedside for labor progression. Patient reports same feeling of contractions.     T(F): 96.8 ( @ 11:42), Max: 96.8 ( @ 11:42)  HR: 71 ( @ 15:58)  BP: 122/68 (04 @ 15:58) (111/60 - 130/69)  RR: 20 ( @ 11:42)    EFM: 135/mod angela/+accels   TOCO: q9-12min   SVE: 4/50/-3/vtx/intact     Medications:  ampicillin  IVPB: 216 ( @ 12:54)  betamethasone Injectable: 12 ( @ 12:53)  famotidine Injectable: 20 ( @ 13:02)      Labs:                        10.4   10.09 )-----------( 214      ( 04 May 2021 13:45 )             30.3           Prenatal Syphilis Test: Nonreact ( @ 13:45)  Antibody Screen: NEG (21 @ 13:45)    Urinalysis Basic - ( 04 May 2021 13:33 )    Color: Yellow / Appearance: Slightly Turbid / S.022 / pH: x  Gluc: x / Ketone: Moderate  / Bili: Negative / Urobili: 3 mg/dL   Blood: x / Protein: 30 mg/dL / Nitrite: Negative   Leuk Esterase: Large / RBC: 3 /HPF / WBC 16 /HPF   Sq Epi: x / Non Sq Epi: 11 /HPF / Bacteria: Negative    GBS pending  Ucx pending

## 2021-05-05 LAB
GROUP B BETA STREP DNA (PCR): SIGNIFICANT CHANGE UP
GROUP B BETA STREP INTERPRETATION: SIGNIFICANT CHANGE UP
SOURCE GROUP B STREP: SIGNIFICANT CHANGE UP

## 2021-05-06 ENCOUNTER — OUTPATIENT (OUTPATIENT)
Dept: OUTPATIENT SERVICES | Facility: HOSPITAL | Age: 26
LOS: 1 days | Discharge: HOME | End: 2021-05-06
Payer: MEDICAID

## 2021-05-06 VITALS
DIASTOLIC BLOOD PRESSURE: 86 MMHG | SYSTOLIC BLOOD PRESSURE: 119 MMHG | TEMPERATURE: 98 F | HEART RATE: 65 BPM | RESPIRATION RATE: 18 BRPM

## 2021-05-06 VITALS — DIASTOLIC BLOOD PRESSURE: 67 MMHG | SYSTOLIC BLOOD PRESSURE: 119 MMHG | HEART RATE: 65 BPM

## 2021-05-06 DIAGNOSIS — Z90.49 ACQUIRED ABSENCE OF OTHER SPECIFIED PARTS OF DIGESTIVE TRACT: Chronic | ICD-10-CM

## 2021-05-06 LAB
CULTURE RESULTS: SIGNIFICANT CHANGE UP
SPECIMEN SOURCE: SIGNIFICANT CHANGE UP

## 2021-05-06 PROCEDURE — 99213 OFFICE O/P EST LOW 20 MIN: CPT

## 2021-05-06 RX ORDER — SODIUM CHLORIDE 9 MG/ML
1000 INJECTION, SOLUTION INTRAVENOUS ONCE
Refills: 0 | Status: COMPLETED | OUTPATIENT
Start: 2021-05-06 | End: 2021-05-06

## 2021-05-06 RX ORDER — CITRIC ACID/SODIUM CITRATE 300-500 MG
30 SOLUTION, ORAL ORAL ONCE
Refills: 0 | Status: COMPLETED | OUTPATIENT
Start: 2021-05-06 | End: 2021-05-06

## 2021-05-06 RX ADMIN — Medication 30 MILLILITER(S): at 03:46

## 2021-05-06 RX ADMIN — SODIUM CHLORIDE 1000 MILLILITER(S): 9 INJECTION, SOLUTION INTRAVENOUS at 03:45

## 2021-05-06 NOTE — OB PROVIDER TRIAGE NOTE - LIVING CHILDREN, OB PROFILE
6 [Pull Self to Stand ___ Months] : Pull self to stand: [unfilled] months [Walk ___ Months] : Walk: [unfilled] months

## 2021-05-06 NOTE — OB PROVIDER TRIAGE NOTE - HISTORY OF PRESENT ILLNESS
24yo  @36w0d by 3rd trimester sonogram, late registrant with limited prenatal care, presented to L&D for contractions starting at 1AM, 3minutes apart, 9/10 in intensity. Patient denies LOF, VB. Good fetal movement. Patient completed a course of celestone on  after  contractions. Patient was evaluated and found to be 4/50/-3. Patient denies headache, chest pain, SOB, LE pain or swelling, nausea, vomiting, fevers, or chills. No other complications this pregnancy. GBS neg

## 2021-05-06 NOTE — OB PROVIDER TRIAGE NOTE - NSHPLABSRESULTS_GEN_ALL_CORE
3/30/21  Type and Screen: A pos  Antibody screen: neg   Rubella: immune   RPR: neg  HbSAg: neg  HIV: NR    Sonograms:  4/13/21: EGA 32w5d, [facility] 6/3/21, cephalic, anterior placenta, MVP 3.86cm, BPP 8/8, EFW 1907g (23%), fetal anatomy incompletely visualized due to advanced gestational age and fetal position, however, no major malformations visualized.

## 2021-05-06 NOTE — OB PROVIDER TRIAGE NOTE - NSHPPHYSICALEXAM_GEN_ALL_CORE
HR: 65 (05-06-21 @ 03:01) (65 - 65)  BP: 119/67 (05-06-21 @ 03:01) (119/67 - 119/67)    Gen: A+OX3. NAD  Abd: Soft, Nontender. Gravid. palpable contractions  FHR: 135bpm/moderate variability/+accelerations/ no decelerations  TOCO: q2mins  SVE: 4/50/-3 vtx intact per Dr. Gutierrez    EFW by Leopolds: 2500gm HR: 65 (05-06-21 @ 03:01) (65 - 65)  BP: 119/67 (05-06-21 @ 03:01) (119/67 - 119/67)    Gen: A+OX3. NAD  Abd: Soft, Nontender. Gravid. palpable contractions  FHR: 135bpm/moderate variability/+accelerations/ no decelerations  TOCO: irregular  SVE: 4/50/-3 vtx intact per Dr. Gutierrez    EFW by Leopolds: 2500gm

## 2021-05-06 NOTE — OB PROVIDER TRIAGE NOTE - NS_OBGYNHISTORY_OBGYN_ALL_OB_FT
OB Hx:     x1all delivered at around 36w, approx 5lbs, denies complications  36w LTCS x1 (second pregnancy) for NRFHR  VBACx4 largest around 5 lbs  eTOPx1, no D&C    GYN Hx:  Denies h/o ovarian cysts, fibroids, STIs, or abnormal pap smears.

## 2021-05-06 NOTE — OB PROVIDER TRIAGE NOTE - NSOBPROVIDERNOTE_OBGYN_ALL_OB_FT
24yo  @36w0d by 3rd trimester sono, late registrant with limited prenatal care, with contractions not in labor, reassuring maternal and fetal status  -will give 1 L bolus of LR  -pain management PRN  -continuous EFM and toco  -re-evaluate    Dr. Gutierrez and Dr. Joshi to be made aware 26yo  @36w0d by 3rd trimester sono, late registrant with limited prenatal care, with contractions r/o  labor, reassuring maternal and fetal status  -will give 1 L bolus of LR  -pain management PRN  -continuous EFM and toco  -re-evaluate    Dr. Onofre and Dr. Hoover to be made aware    Addendum:    Patient re-evaluated after IVF hydration, patient resting comfortably in triage bed,   EFM: 145bpm/moderate variability/+accelerations/no decelerations (loss of contact patient turned to side maternal heart beat picked up)  toco: irregular  SVE: 50/-3 vtx intact    26yo  @36w0d by 3rd trimester sono, late registrant with limited prenatal care, with contractions not in labor, reassuring maternal and fetal status  -labor precautions given, FKC advised, PO maternal hydration  -patient encouraged to follow up at next appointment    Dr. hoover and Dr. onofre aware

## 2021-05-11 ENCOUNTER — APPOINTMENT (OUTPATIENT)
Dept: ANTEPARTUM | Facility: CLINIC | Age: 26
End: 2021-05-11

## 2021-05-13 ENCOUNTER — INPATIENT (INPATIENT)
Facility: HOSPITAL | Age: 26
LOS: 0 days | Discharge: HOME | End: 2021-05-14
Attending: OBSTETRICS & GYNECOLOGY | Admitting: OBSTETRICS & GYNECOLOGY
Payer: MEDICAID

## 2021-05-13 VITALS
TEMPERATURE: 98 F | RESPIRATION RATE: 18 BRPM | WEIGHT: 162.92 LBS | HEIGHT: 68 IN | HEART RATE: 79 BPM | DIASTOLIC BLOOD PRESSURE: 81 MMHG | SYSTOLIC BLOOD PRESSURE: 122 MMHG

## 2021-05-13 DIAGNOSIS — Z90.49 ACQUIRED ABSENCE OF OTHER SPECIFIED PARTS OF DIGESTIVE TRACT: Chronic | ICD-10-CM

## 2021-05-13 LAB
ALBUMIN SERPL ELPH-MCNC: 3.3 G/DL — LOW (ref 3.5–5.2)
ALP SERPL-CCNC: 108 U/L — SIGNIFICANT CHANGE UP (ref 30–115)
ALT FLD-CCNC: 34 U/L — SIGNIFICANT CHANGE UP (ref 0–41)
ANION GAP SERPL CALC-SCNC: 11 MMOL/L — SIGNIFICANT CHANGE UP (ref 7–14)
AST SERPL-CCNC: 35 U/L — SIGNIFICANT CHANGE UP (ref 0–41)
BASOPHILS # BLD AUTO: 0.05 K/UL — SIGNIFICANT CHANGE UP (ref 0–0.2)
BASOPHILS # BLD AUTO: 0.05 K/UL — SIGNIFICANT CHANGE UP (ref 0–0.2)
BASOPHILS NFR BLD AUTO: 0.5 % — SIGNIFICANT CHANGE UP (ref 0–1)
BASOPHILS NFR BLD AUTO: 0.5 % — SIGNIFICANT CHANGE UP (ref 0–1)
BILIRUB SERPL-MCNC: 0.3 MG/DL — SIGNIFICANT CHANGE UP (ref 0.2–1.2)
BLD GP AB SCN SERPL QL: SIGNIFICANT CHANGE UP
BUN SERPL-MCNC: 9 MG/DL — LOW (ref 10–20)
CALCIUM SERPL-MCNC: 9.2 MG/DL — SIGNIFICANT CHANGE UP (ref 8.5–10.1)
CHLORIDE SERPL-SCNC: 101 MMOL/L — SIGNIFICANT CHANGE UP (ref 98–110)
CO2 SERPL-SCNC: 21 MMOL/L — SIGNIFICANT CHANGE UP (ref 17–32)
CREAT SERPL-MCNC: 0.5 MG/DL — LOW (ref 0.7–1.5)
EOSINOPHIL # BLD AUTO: 0.09 K/UL — SIGNIFICANT CHANGE UP (ref 0–0.7)
EOSINOPHIL # BLD AUTO: 0.09 K/UL — SIGNIFICANT CHANGE UP (ref 0–0.7)
EOSINOPHIL NFR BLD AUTO: 0.8 % — SIGNIFICANT CHANGE UP (ref 0–8)
EOSINOPHIL NFR BLD AUTO: 0.9 % — SIGNIFICANT CHANGE UP (ref 0–8)
GLUCOSE SERPL-MCNC: 84 MG/DL — SIGNIFICANT CHANGE UP (ref 70–99)
HCT VFR BLD CALC: 31 % — LOW (ref 37–47)
HCT VFR BLD CALC: 32 % — LOW (ref 37–47)
HGB BLD-MCNC: 10.5 G/DL — LOW (ref 12–16)
HGB BLD-MCNC: 11 G/DL — LOW (ref 12–16)
IMM GRANULOCYTES NFR BLD AUTO: 0.5 % — HIGH (ref 0.1–0.3)
IMM GRANULOCYTES NFR BLD AUTO: 0.7 % — HIGH (ref 0.1–0.3)
LDH SERPL L TO P-CCNC: 225 — SIGNIFICANT CHANGE UP (ref 50–242)
LYMPHOCYTES # BLD AUTO: 2.68 K/UL — SIGNIFICANT CHANGE UP (ref 1.2–3.4)
LYMPHOCYTES # BLD AUTO: 2.79 K/UL — SIGNIFICANT CHANGE UP (ref 1.2–3.4)
LYMPHOCYTES # BLD AUTO: 25.2 % — SIGNIFICANT CHANGE UP (ref 20.5–51.1)
LYMPHOCYTES # BLD AUTO: 28.9 % — SIGNIFICANT CHANGE UP (ref 20.5–51.1)
MCHC RBC-ENTMCNC: 29.8 PG — SIGNIFICANT CHANGE UP (ref 27–31)
MCHC RBC-ENTMCNC: 30.4 PG — SIGNIFICANT CHANGE UP (ref 27–31)
MCHC RBC-ENTMCNC: 33.9 G/DL — SIGNIFICANT CHANGE UP (ref 32–37)
MCHC RBC-ENTMCNC: 34.4 G/DL — SIGNIFICANT CHANGE UP (ref 32–37)
MCV RBC AUTO: 88.1 FL — SIGNIFICANT CHANGE UP (ref 81–99)
MCV RBC AUTO: 88.4 FL — SIGNIFICANT CHANGE UP (ref 81–99)
MONOCYTES # BLD AUTO: 0.62 K/UL — HIGH (ref 0.1–0.6)
MONOCYTES # BLD AUTO: 0.68 K/UL — HIGH (ref 0.1–0.6)
MONOCYTES NFR BLD AUTO: 5.8 % — SIGNIFICANT CHANGE UP (ref 1.7–9.3)
MONOCYTES NFR BLD AUTO: 7 % — SIGNIFICANT CHANGE UP (ref 1.7–9.3)
NEUTROPHILS # BLD AUTO: 6 K/UL — SIGNIFICANT CHANGE UP (ref 1.4–6.5)
NEUTROPHILS # BLD AUTO: 7.13 K/UL — HIGH (ref 1.4–6.5)
NEUTROPHILS NFR BLD AUTO: 62.2 % — SIGNIFICANT CHANGE UP (ref 42.2–75.2)
NEUTROPHILS NFR BLD AUTO: 67 % — SIGNIFICANT CHANGE UP (ref 42.2–75.2)
NRBC # BLD: 0 /100 WBCS — SIGNIFICANT CHANGE UP (ref 0–0)
NRBC # BLD: 0 /100 WBCS — SIGNIFICANT CHANGE UP (ref 0–0)
PLATELET # BLD AUTO: 195 K/UL — SIGNIFICANT CHANGE UP (ref 130–400)
PLATELET # BLD AUTO: 211 K/UL — SIGNIFICANT CHANGE UP (ref 130–400)
POTASSIUM SERPL-MCNC: 4.9 MMOL/L — SIGNIFICANT CHANGE UP (ref 3.5–5)
POTASSIUM SERPL-SCNC: 4.9 MMOL/L — SIGNIFICANT CHANGE UP (ref 3.5–5)
PRENATAL SYPHILIS TEST: SIGNIFICANT CHANGE UP
PROT SERPL-MCNC: 7.1 G/DL — SIGNIFICANT CHANGE UP (ref 6–8)
RBC # BLD: 3.52 M/UL — LOW (ref 4.2–5.4)
RBC # BLD: 3.62 M/UL — LOW (ref 4.2–5.4)
RBC # FLD: 11.9 % — SIGNIFICANT CHANGE UP (ref 11.5–14.5)
RBC # FLD: 12.2 % — SIGNIFICANT CHANGE UP (ref 11.5–14.5)
SARS-COV-2 RNA SPEC QL NAA+PROBE: SIGNIFICANT CHANGE UP
SODIUM SERPL-SCNC: 133 MMOL/L — LOW (ref 135–146)
URATE SERPL-MCNC: 4.9 MG/DL — SIGNIFICANT CHANGE UP (ref 2.5–7)
WBC # BLD: 10.64 K/UL — SIGNIFICANT CHANGE UP (ref 4.8–10.8)
WBC # BLD: 9.66 K/UL — SIGNIFICANT CHANGE UP (ref 4.8–10.8)
WBC # FLD AUTO: 10.64 K/UL — SIGNIFICANT CHANGE UP (ref 4.8–10.8)
WBC # FLD AUTO: 9.66 K/UL — SIGNIFICANT CHANGE UP (ref 4.8–10.8)

## 2021-05-13 PROCEDURE — 59409 OBSTETRICAL CARE: CPT | Mod: U7

## 2021-05-13 RX ORDER — DIBUCAINE 1 %
1 OINTMENT (GRAM) RECTAL EVERY 6 HOURS
Refills: 0 | Status: DISCONTINUED | OUTPATIENT
Start: 2021-05-13 | End: 2021-05-14

## 2021-05-13 RX ORDER — SIMETHICONE 80 MG/1
80 TABLET, CHEWABLE ORAL EVERY 4 HOURS
Refills: 0 | Status: DISCONTINUED | OUTPATIENT
Start: 2021-05-13 | End: 2021-05-14

## 2021-05-13 RX ORDER — PRAMOXINE HYDROCHLORIDE 150 MG/15G
1 AEROSOL, FOAM RECTAL EVERY 4 HOURS
Refills: 0 | Status: DISCONTINUED | OUTPATIENT
Start: 2021-05-13 | End: 2021-05-14

## 2021-05-13 RX ORDER — OXYCODONE HYDROCHLORIDE 5 MG/1
5 TABLET ORAL
Refills: 0 | Status: DISCONTINUED | OUTPATIENT
Start: 2021-05-13 | End: 2021-05-14

## 2021-05-13 RX ORDER — LANOLIN
1 OINTMENT (GRAM) TOPICAL EVERY 6 HOURS
Refills: 0 | Status: DISCONTINUED | OUTPATIENT
Start: 2021-05-13 | End: 2021-05-14

## 2021-05-13 RX ORDER — HYDROCORTISONE 1 %
1 OINTMENT (GRAM) TOPICAL EVERY 6 HOURS
Refills: 0 | Status: DISCONTINUED | OUTPATIENT
Start: 2021-05-13 | End: 2021-05-14

## 2021-05-13 RX ORDER — KETOROLAC TROMETHAMINE 30 MG/ML
30 SYRINGE (ML) INJECTION ONCE
Refills: 0 | Status: DISCONTINUED | OUTPATIENT
Start: 2021-05-13 | End: 2021-05-13

## 2021-05-13 RX ORDER — IBUPROFEN 200 MG
600 TABLET ORAL EVERY 6 HOURS
Refills: 0 | Status: COMPLETED | OUTPATIENT
Start: 2021-05-13 | End: 2022-04-11

## 2021-05-13 RX ORDER — OXYTOCIN 10 UNIT/ML
333.33 VIAL (ML) INJECTION
Qty: 20 | Refills: 0 | Status: DISCONTINUED | OUTPATIENT
Start: 2021-05-13 | End: 2021-05-13

## 2021-05-13 RX ORDER — MAGNESIUM HYDROXIDE 400 MG/1
30 TABLET, CHEWABLE ORAL
Refills: 0 | Status: DISCONTINUED | OUTPATIENT
Start: 2021-05-13 | End: 2021-05-14

## 2021-05-13 RX ORDER — DIPHENHYDRAMINE HCL 50 MG
25 CAPSULE ORAL EVERY 6 HOURS
Refills: 0 | Status: DISCONTINUED | OUTPATIENT
Start: 2021-05-13 | End: 2021-05-14

## 2021-05-13 RX ORDER — OXYCODONE HYDROCHLORIDE 5 MG/1
5 TABLET ORAL ONCE
Refills: 0 | Status: DISCONTINUED | OUTPATIENT
Start: 2021-05-13 | End: 2021-05-14

## 2021-05-13 RX ORDER — AER TRAVELER 0.5 G/1
1 SOLUTION RECTAL; TOPICAL EVERY 4 HOURS
Refills: 0 | Status: DISCONTINUED | OUTPATIENT
Start: 2021-05-13 | End: 2021-05-14

## 2021-05-13 RX ORDER — SODIUM CHLORIDE 9 MG/ML
1000 INJECTION, SOLUTION INTRAVENOUS
Refills: 0 | Status: DISCONTINUED | OUTPATIENT
Start: 2021-05-13 | End: 2021-05-13

## 2021-05-13 RX ORDER — FAMOTIDINE 10 MG/ML
20 INJECTION INTRAVENOUS DAILY
Refills: 0 | Status: DISCONTINUED | OUTPATIENT
Start: 2021-05-13 | End: 2021-05-14

## 2021-05-13 RX ORDER — ACETAMINOPHEN 500 MG
975 TABLET ORAL
Refills: 0 | Status: DISCONTINUED | OUTPATIENT
Start: 2021-05-13 | End: 2021-05-14

## 2021-05-13 RX ORDER — OXYTOCIN 10 UNIT/ML
333.33 VIAL (ML) INJECTION
Qty: 20 | Refills: 0 | Status: DISCONTINUED | OUTPATIENT
Start: 2021-05-13 | End: 2021-05-14

## 2021-05-13 RX ORDER — TETANUS TOXOID, REDUCED DIPHTHERIA TOXOID AND ACELLULAR PERTUSSIS VACCINE, ADSORBED 5; 2.5; 8; 8; 2.5 [IU]/.5ML; [IU]/.5ML; UG/.5ML; UG/.5ML; UG/.5ML
0.5 SUSPENSION INTRAMUSCULAR ONCE
Refills: 0 | Status: DISCONTINUED | OUTPATIENT
Start: 2021-05-13 | End: 2021-05-14

## 2021-05-13 RX ORDER — BENZOCAINE 10 %
1 GEL (GRAM) MUCOUS MEMBRANE EVERY 6 HOURS
Refills: 0 | Status: DISCONTINUED | OUTPATIENT
Start: 2021-05-13 | End: 2021-05-14

## 2021-05-13 RX ORDER — SODIUM CHLORIDE 9 MG/ML
3 INJECTION INTRAMUSCULAR; INTRAVENOUS; SUBCUTANEOUS EVERY 8 HOURS
Refills: 0 | Status: DISCONTINUED | OUTPATIENT
Start: 2021-05-13 | End: 2021-05-14

## 2021-05-13 RX ORDER — IBUPROFEN 200 MG
600 TABLET ORAL EVERY 6 HOURS
Refills: 0 | Status: DISCONTINUED | OUTPATIENT
Start: 2021-05-13 | End: 2021-05-14

## 2021-05-13 RX ADMIN — Medication 600 MILLIGRAM(S): at 12:30

## 2021-05-13 RX ADMIN — Medication 975 MILLIGRAM(S): at 14:31

## 2021-05-13 RX ADMIN — SODIUM CHLORIDE 3 MILLILITER(S): 9 INJECTION INTRAMUSCULAR; INTRAVENOUS; SUBCUTANEOUS at 14:32

## 2021-05-13 RX ADMIN — Medication 1000 MILLIUNIT(S)/MIN: at 05:56

## 2021-05-13 RX ADMIN — Medication 30 MILLIGRAM(S): at 07:03

## 2021-05-13 RX ADMIN — FAMOTIDINE 20 MILLIGRAM(S): 10 INJECTION INTRAVENOUS at 16:56

## 2021-05-13 RX ADMIN — Medication 600 MILLIGRAM(S): at 17:27

## 2021-05-13 RX ADMIN — Medication 600 MILLIGRAM(S): at 12:00

## 2021-05-13 RX ADMIN — Medication 30 MILLIGRAM(S): at 05:54

## 2021-05-13 RX ADMIN — Medication 600 MILLIGRAM(S): at 23:52

## 2021-05-13 RX ADMIN — SIMETHICONE 80 MILLIGRAM(S): 80 TABLET, CHEWABLE ORAL at 10:15

## 2021-05-13 RX ADMIN — Medication 1 TABLET(S): at 14:28

## 2021-05-13 NOTE — OB PROVIDER DELIVERY SUMMARY - NSPROVIDERDELIVERYNOTE_OBGYN_ALL_OB_FT
Patient was fully dilated and pushing. Fetal head was OA and restituted to ROT. The anterior and posterior shoulders delivered, followed by the remaining body atraumatically. Delayed cord clamping was performed, and then clamped and cut. Cord blood gases collected x2. The  was handed to the mother and RN. The placenta delivered intact with membranes. Pitocin was administered. Uterus massaged, fundus found to be firm. Cervix, vagina and perineum inspected, no lacerations were noted, with good hemostasis.     Viable male infant delivered, weighing 5-15, with APGARs 9/9    Laceration: none  EBL 200cc    Dr. Paul present for the delivery Patient was fully dilated and pushing. Fetal head was OA and restituted to ROT. The anterior and posterior shoulders delivered, followed by the remaining body atraumatically. Delayed cord clamping was performed, and then clamped and cut. Cord blood gases collected x2. The  was handed to the mother and RN. The placenta delivered intact with membranes. Pitocin was administered. Uterus massaged, fundus found to be firm. Cervix, vagina and perineum inspected, no lacerations were noted, with good hemostasis.     Viable male infant delivered, weighing 5-15, with APGARs 9/9    Laceration: none    OB ATTENDING: Present and performed uncomplicated vaginal delivery. Agree with resident note. - EDEL JACKMAN 200cc    Dr. Paul present for the delivery

## 2021-05-13 NOTE — OB PROVIDER H&P - ASSESSMENT
26yo  @37w0d by 3rd trimester sono, late registrant with limited prenatal care, h/o LTCS x1,  x4, desiring TOLAC, in labor.    -Admit to L&D  -Admission labs  -Monitor vitals  -Cont EFM/[place]  -Pain management prn  -Clear liquid diet    Dr. Paul and Dr. Wood aware

## 2021-05-13 NOTE — OB RN DELIVERY SUMMARY - NS_RESUSCITEFFORT_OBGYN_ALL_OB
Spoke to Blessing regarding the cardiac rehab referral, s/p ptca 1/25/21.  The program was discussed and questions answered, but Blessing refuses to exercise with a mask, so she declined.  Reinforced the covid guidelines we are following, she state \"with my copd I will not be able to get my air\".  Enc to call us back if changes her mind   Bulb [place]-Pharynx Suction Only

## 2021-05-13 NOTE — OB RN PATIENT PROFILE - NS_OBGYNHISTORY_OBGYN_ALL_OB_FT
OB Hx:     x1all delivered at around 36w, approx 5lbs, denies complications  36w LTCS x1 (second pregnancy) for NRFHR  VBACx4 largest 6 lbs  eTOPx1, no D&C    GYN Hx:  Denies h/o ovarian cysts, fibroids, STIs, or abnormal pap smears.

## 2021-05-13 NOTE — OB RN PATIENT PROFILE - NS_SOURCEOFINFO_OBGYN_ALL_OB
[]Hover for attribution information  Mom was frustrated with pain breast feeding was causing her due to baby being tongue tied and only latched on the tip of her nipple  Mom requested bottle and infant fed 16 mls similiac with no issues  Mom was educated on possible difficulty of switching between formula and breast milk as well as bottle and breast  Offered to assist with hand expressing and syringe/cup feeding or trying a pump  Mom denied  Mom still requested bottle after education  Maternal and  discharge education completed with patient and FOB  Patient has watched videos and was given handouts  Patient verbalizes understanding  EMS

## 2021-05-13 NOTE — OB PROVIDER DELIVERY SUMMARY - NSSELHIDDEN_OBGYN_ALL_OB_FT
[NS_DeliveryAttending1_OBGYN_ALL_OB_FT:[email or web address]=],[NS_DeliveryRN_OBGYN_ALL_OB_FT:[email or web address]=],[NS_CirculateRN2_OBGYN_ALL_OB_FT:[email or web address]=]

## 2021-05-13 NOTE — OB PROVIDER H&P - HISTORY OF PRESENT ILLNESS
24yo  @37w0d by 3rd trimester sonogram, late registrant with limited prenatal care, presented to L&D for contractions starting at 1AM, 1-2 minutes apart, 9/10 in intensity. Patient denies LOF, VB. Good fetal movement. Patient completed a course of celestone on  after  contractions. Patient denies headache, chest pain, SOB, LE pain or swelling, nausea, vomiting, fevers, or chills. No other complications this pregnancy. GBS neg.

## 2021-05-13 NOTE — OB PROVIDER H&P - NSHPPHYSICALEXAM_GEN_ALL_CORE
Vital Signs Last 24 Hrs  T(C): 36.6 (13 May 2021 02:16), Max: 36.6 (13 May 2021 02:16)  T(F): 97.9 (13 May 2021 02:16), Max: 97.9 (13 May 2021 02:16)  HR: 79 (13 May 2021 02:16) (79 - 79)  BP: 122/81 (13 May 2021 02:16) (122/81 - 122/81)  RR: 18 (13 May 2021 02:16) (18 - 18)    Gen: AOx3 NAD  EFM: 130/mod/+accels  [place]: q5m  SVE: 5/60/-2, vertex, intact  Abd: soft, gravid, nontender, palpable ctx, no incisional tenderness Vital Signs Last 24 Hrs  T(C): 36.6 (13 May 2021 02:16), Max: 36.6 (13 May 2021 02:16)  T(F): 97.9 (13 May 2021 02:16), Max: 97.9 (13 May 2021 02:16)  HR: 79 (13 May 2021 02:16) (79 - 79)  BP: 122/81 (13 May 2021 02:16) (122/81 - 122/81)  RR: 18 (13 May 2021 02:16) (18 - 18)    Gen: AOx3 NAD  EFM: 130/mod/+accels  [place]: q5m  SVE: 5/60/-2, vertex, intact  Abd: soft, gravid, nontender, palpable ctx, no incisional tenderness  BSS: cephalic

## 2021-05-14 ENCOUNTER — TRANSCRIPTION ENCOUNTER (OUTPATIENT)
Age: 26
End: 2021-05-14

## 2021-05-14 VITALS
DIASTOLIC BLOOD PRESSURE: 75 MMHG | HEART RATE: 60 BPM | SYSTOLIC BLOOD PRESSURE: 118 MMHG | RESPIRATION RATE: 18 BRPM | TEMPERATURE: 98 F

## 2021-05-14 LAB
COVID-19 SPIKE DOMAIN AB INTERP: NEGATIVE — SIGNIFICANT CHANGE UP
COVID-19 SPIKE DOMAIN ANTIBODY RESULT: 0.4 U/ML — SIGNIFICANT CHANGE UP
SARS-COV-2 IGG+IGM SERPL QL IA: 0.4 U/ML — SIGNIFICANT CHANGE UP
SARS-COV-2 IGG+IGM SERPL QL IA: NEGATIVE — SIGNIFICANT CHANGE UP

## 2021-05-14 PROCEDURE — 99238 HOSP IP/OBS DSCHRG MGMT 30/<: CPT

## 2021-05-14 RX ORDER — IBUPROFEN 200 MG
1 TABLET ORAL
Qty: 0 | Refills: 0 | DISCHARGE
Start: 2021-05-14

## 2021-05-14 RX ORDER — ACETAMINOPHEN 500 MG
3 TABLET ORAL
Qty: 0 | Refills: 0 | DISCHARGE
Start: 2021-05-14

## 2021-05-14 RX ADMIN — Medication 975 MILLIGRAM(S): at 04:01

## 2021-05-14 RX ADMIN — Medication 600 MILLIGRAM(S): at 06:22

## 2021-05-14 RX ADMIN — Medication 600 MILLIGRAM(S): at 00:35

## 2021-05-14 RX ADMIN — Medication 975 MILLIGRAM(S): at 09:30

## 2021-05-14 RX ADMIN — Medication 600 MILLIGRAM(S): at 07:20

## 2021-05-14 RX ADMIN — Medication 1 TABLET(S): at 11:23

## 2021-05-14 RX ADMIN — Medication 975 MILLIGRAM(S): at 08:34

## 2021-05-14 RX ADMIN — Medication 600 MILLIGRAM(S): at 11:23

## 2021-05-14 RX ADMIN — Medication 600 MILLIGRAM(S): at 12:20

## 2021-05-14 RX ADMIN — Medication 975 MILLIGRAM(S): at 04:31

## 2021-05-14 NOTE — PROGRESS NOTE ADULT - ASSESSMENT
24yo now P7, s/p successful , PPD#1, recovering well   - encourage ambulation  - PO hydration  - Continue Diet as tolerated  - routine postpartum care   - monitor vitals/bleeding  - anticipate d/c home   - instructed to follow up in 6 weeks for postpartum check  - desires BTL, will f/u outpatient to schedule      and attending to be made aware 24yo now P7, s/p successful , PPD#1, recovering well     - encourage ambulation  - PO hydration  - Continue Diet as tolerated  - routine postpartum care   - monitor vitals/bleeding  - anticipate d/c home   - instructed to follow up in 6 weeks for postpartum check  - desires BTL, will f/u outpatient to schedule      and attending to be made aware

## 2021-05-14 NOTE — PROGRESS NOTE ADULT - SUBJECTIVE AND OBJECTIVE BOX
FREDDIE VILLAGOMEZ  26y  Female  CC: Postpartum  PGY1 Note:  Patient seen and examined bedside. No overnight events. Denies heavy vaginal bleeding and reports pain well controlled. Ambulating and voiding without difficulty. Tolerating Regular Diet. Denies dizziness, lightheadedness, SOB, or palpitations. Denies fever, chills, nauseas, vomiting.      PAST MEDICAL & SURGICAL HISTORY:  Anemia    S/P laparoscopic cholecystectomy     delivery delivered        Physical Exam  Vital Signs Last 24 Hrs  T(C): 36.3 (13 May 2021 23:37), Max: 36.7 (13 May 2021 15:19)  T(F): 97.4 (13 May 2021 23:37), Max: 98 (13 May 2021 15:19)  HR: 61 (13 May 2021 23:37) (61 - 70)  BP: 120/83 (13 May 2021 23:37) (116/61 - 123/69)  RR: 18 (13 May 2021 23:37) (18 - 19)    Gen: AAOx3, NAD  CV: RRR. No murmors gallops or rubs.  Pulm: CTAB. No wheezes or rales.  Ext: No calf tenderness, no swelling.   Abd: Soft, nontender, nondistended, BS+  Fundus firm, and below umbilicus. Nontender.     Labs:                        10.5   10.64 )-----------( 195      ( 13 May 2021 17:08 )             31.0                         11.0   9.66  )-----------( 211      ( 13 May 2021 02:18 )             32.0        MEDICATIONS  (STANDING):  acetaminophen   Tablet .. 975 milliGRAM(s) Oral <User Schedule>  diphtheria/tetanus/pertussis (acellular) Vaccine (ADAcel) 0.5 milliLiter(s) IntraMuscular once  famotidine    Tablet 20 milliGRAM(s) Oral daily  ibuprofen  Tablet. 600 milliGRAM(s) Oral every 6 hours  oxytocin Infusion 333.333 milliUNIT(s)/Min (1000 mL/Hr) IV Continuous <Continuous>  prenatal multivitamin 1 Tablet(s) Oral daily  sodium chloride 0.9% lock flush 3 milliLiter(s) IV Push every 8 hours    MEDICATIONS  (PRN):  benzocaine 20%/menthol 0.5% Spray 1 Spray(s) Topical every 6 hours PRN for Perineal discomfort  dibucaine 1% Ointment 1 Application(s) Topical every 6 hours PRN Perineal discomfort  diphenhydrAMINE 25 milliGRAM(s) Oral every 6 hours PRN Pruritus  hydrocortisone 1% Cream 1 Application(s) Topical every 6 hours PRN Moderate Pain (4-6)  lanolin Ointment 1 Application(s) Topical every 6 hours PRN nipple soreness  magnesium hydroxide Suspension 30 milliLiter(s) Oral two times a day PRN Constipation  oxyCODONE    IR 5 milliGRAM(s) Oral every 3 hours PRN Moderate to Severe Pain (4-10)  oxyCODONE    IR 5 milliGRAM(s) Oral once PRN Moderate to Severe Pain (4-10)  pramoxine 1%/zinc 5% Cream 1 Application(s) Topical every 4 hours PRN Moderate Pain (4-6)  simethicone 80 milliGRAM(s) Chew every 4 hours PRN Gas  witch hazel Pads 1 Application(s) Topical every 4 hours PRN Perineal discomfort

## 2021-05-14 NOTE — DISCHARGE NOTE OB - PATIENT PORTAL LINK FT
You can access the FollowMyHealth Patient Portal offered by [facility] by registering at the following website: [email or web address]. By joining [facility]’s FollowMyHealth portal, you will also be able to view your health information using other applications (apps) compatible with our system.

## 2021-05-14 NOTE — DISCHARGE NOTE OB - CARE PROVIDER_API CALL
Ladan Paul)  OBSGYN  Physicians  [address] [place] [address]  Phone: [phone]  Fax: [phone]  Follow Up Time:

## 2021-05-14 NOTE — PROGRESS NOTE ADULT - ATTENDING COMMENTS
Patient seen, case discussed with resident  PPD#1  doing well.  Stable for d/c home.   Will schedule BTL as outpatient.   F/u 6 weeks for postpartum visit.  Precautions discussed.

## 2021-05-19 DIAGNOSIS — O09.33 SUPERVISION OF PREGNANCY WITH INSUFFICIENT ANTENATAL CARE, THIRD TRIMESTER: ICD-10-CM

## 2021-05-19 DIAGNOSIS — O34.211 MATERNAL CARE FOR LOW TRANSVERSE SCAR FROM PREVIOUS CESAREAN DELIVERY: ICD-10-CM

## 2021-05-19 DIAGNOSIS — Z3A.37 37 WEEKS GESTATION OF PREGNANCY: ICD-10-CM

## 2021-07-15 ENCOUNTER — APPOINTMENT (OUTPATIENT)
Dept: OBGYN | Facility: CLINIC | Age: 26
End: 2021-07-15

## 2021-09-21 ENCOUNTER — APPOINTMENT (OUTPATIENT)
Dept: OBGYN | Facility: CLINIC | Age: 26
End: 2021-09-21

## 2022-01-12 NOTE — OB RN PATIENT PROFILE - BMI (KG/M2)
24.8 Composite Graft Text: The defect edges were debeveled with a #15 scalpel blade.  Given the location of the defect, shape of the defect, the proximity to free margins and the fact the defect was full thickness a composite graft was deemed most appropriate.  The defect was outline and then transferred to the donor site.  A full thickness graft was then excised from the donor site. The graft was then placed in the primary defect, oriented appropriately and then sutured into place.  The secondary defect was then repaired using a primary closure.

## 2022-02-22 ENCOUNTER — EMERGENCY (EMERGENCY)
Facility: HOSPITAL | Age: 27
LOS: 0 days | Discharge: HOME | End: 2022-02-22
Attending: STUDENT IN AN ORGANIZED HEALTH CARE EDUCATION/TRAINING PROGRAM | Admitting: STUDENT IN AN ORGANIZED HEALTH CARE EDUCATION/TRAINING PROGRAM
Payer: MEDICAID

## 2022-02-22 VITALS
TEMPERATURE: 99 F | RESPIRATION RATE: 17 BRPM | HEART RATE: 87 BPM | HEIGHT: 68 IN | DIASTOLIC BLOOD PRESSURE: 67 MMHG | WEIGHT: 164.91 LBS | OXYGEN SATURATION: 98 % | SYSTOLIC BLOOD PRESSURE: 145 MMHG

## 2022-02-22 DIAGNOSIS — N64.52 NIPPLE DISCHARGE: ICD-10-CM

## 2022-02-22 DIAGNOSIS — Z90.49 ACQUIRED ABSENCE OF OTHER SPECIFIED PARTS OF DIGESTIVE TRACT: Chronic | ICD-10-CM

## 2022-02-22 DIAGNOSIS — N61.0 MASTITIS WITHOUT ABSCESS: ICD-10-CM

## 2022-02-22 DIAGNOSIS — N64.4 MASTODYNIA: ICD-10-CM

## 2022-02-22 PROCEDURE — 99283 EMERGENCY DEPT VISIT LOW MDM: CPT

## 2022-02-22 RX ORDER — DICLOXACILLIN SODIUM 500 MG/1
1 CAPSULE ORAL
Qty: 40 | Refills: 0
Start: 2022-02-22 | End: 2022-03-03

## 2022-02-22 NOTE — ED ADULT TRIAGE NOTE - CHIEF COMPLAINT QUOTE
pt c.o lump/pain on breast. pt has discharge and foul odor coming from breast.  pt also had fever last night

## 2022-02-22 NOTE — ED PROVIDER NOTE - NS ED ROS FT
Constitutional: (-) fever  Eyes/ENT: (-) blurry vision, (-) epistaxis  Cardiovascular: (-) chest pain, (-) syncope  Respiratory: (-) cough, (-) shortness of breath  Gastrointestinal: (-) vomiting, (-) diarrhea  : (-) dysuria, (-) hematuria  Musculoskeletal: (-) neck pain, (-) back pain, (-) joint pain  Integumentary: (+) L breast pain, (+) dc, (-) rash, (-) edema  Neurological: (-) headache, (-) altered mental status  Allergic/Immunologic: (-) pruritus

## 2022-02-22 NOTE — ED PROVIDER NOTE - NSFOLLOWUPINSTRUCTIONS_ED_ALL_ED_FT
Follow up with PMD and Breast clinic in 1-2 days.    Cellulitis    Cellulitis is a skin infection caused by bacteria. This condition occurs most often in the arms and lower legs but can occur anywhere over the body. Symptoms include redness, swelling, warm skin, tenderness, and chills/fever. If you were prescribed an antibiotic medicine, take it as told by your health care provider. Do not stop taking the antibiotic even if you start to feel better.    SEEK IMMEDIATE MEDICAL CARE IF YOU HAVE ANY OF THE FOLLOWING SYMPTOMS: worsening fever, red streaks coming from affected area, vomiting or diarrhea, or dizziness/lightheadedness.

## 2022-02-22 NOTE — ED PROVIDER NOTE - CLINICAL SUMMARY MEDICAL DECISION MAKING FREE TEXT BOX
I personally evaluated the patient. I reviewed the Resident´s or Physician Assistant´s note (as assigned above), and agree with the findings and plan except as documented in my note.  Patient evaluated for left breast pain and discharge.  Patient given antibiotics for home and given follow-up with breast clinic.  Patient not currently breast-feeding.  I have fully discussed the medical management and delivery of care with the patient. I have discussed any available labs, imaging and treatment options with the patient. Patient confirms understanding and has been given detailed return precautions. Patient instructed to return to the ED should symptoms persist or worsen. Patient has demonstrated capacity and has verbalized understanding. Patient is well appearing upon discharge.

## 2022-02-22 NOTE — ED PROVIDER NOTE - PHYSICAL EXAMINATION
CONST: NAD  EYES: Sclera and conjunctiva clear.   ENT: No nasal discharge. Oropharynx normal appearing  NECK: Non-tender, no meningeal signs. normal ROM. supple   CARD: S1 S2; No jvd  RESP: Equal BS B/L, No wheezes, rhonchi or rales. No distress  GI: Soft, non-tender, non-distended. normal BS  MS: Normal ROM in all extremities. pulses 2 +. no calf tenderness or swelling  SKIN: L breast tenderness with small area of induration  NEURO: A&Ox3, No focal deficits. Strength 5/5 with no sensory deficits.

## 2022-02-22 NOTE — ED PROVIDER NOTE - OBJECTIVE STATEMENT
26y F pmh anemia presents for eval of L breast pain. Pt has mild aching L breast pain x1wk with associated dc from nipple, no aggravating or relieving factors.

## 2022-02-22 NOTE — ED PROVIDER NOTE - ATTENDING CONTRIBUTION TO CARE
26-year-old female no past medical history presents with left nipple discharge.  Patient noted pain over left breast with purulent discharge for the past few days.  Patient was recently breast-feeding however has not breast-fed for the past 7 months.  No fever/chills, no external skin changes, no chest pain, no shortness of breath, no abdominal pain, no nausea/vomiting/diarrhea.    CONSTITUTIONAL: Well-developed; well-nourished; in no acute distress. Sitting up and providing appropriate history and physical examination  SKIN: skin exam is warm and dry, no acute rash.  HEAD: Normocephalic; atraumatic.  EYES: PERRL, 3 mm bilateral, no nystagmus, EOM intact; conjunctiva and sclera clear.  ENT: No nasal discharge; airway clear.  NECK: Supple; non tender. + full passive ROM in all directions. No JVD  CARD: S1, S2 normal; no murmurs, gallops, or rubs. Regular rate and rhythm. + Symmetric Strong Pulses  RESP: No wheezes, rales or rhonchi. Good air movement bilaterally  ABD: soft; non-distended; non-tender. No Rebound, No Guarding, No signs of peritonitis, No CVA tenderness. No pulsatile abdominal mass. + Strong and Symmetric Pulses  EXT: Normal ROM. No clubbing, cyanosis or edema. Dp and Pt Pulses intact. Cap refill less than 3 seconds  NEURO: CN 2-12 intact, normal finger to nose, normal romberg, stable gait, no sensory or motor deficits, Alert, oriented, grossly unremarkable. No Focal deficits. GCS 15. NIH 0  PSYCH: Cooperative, appropriate.  BREAST: Tenderness to palpation over left breast.  No nipple discharge or drainage noted on exam.  No overlying skin changes.

## 2022-02-22 NOTE — ED PROVIDER NOTE - NSFOLLOWUPCLINICS_GEN_ALL_ED_FT
[facility]  Breast  [address]  [place] [address]  Phone: [phone]  Fax:

## 2022-02-22 NOTE — ED PROVIDER NOTE - PATIENT PORTAL LINK FT
You can access the FollowMyHealth Patient Portal offered by [facility] by registering at the following website: [email or web address]. By joining [facility]’s FollowMyHealth portal, you will also be able to view your health information using other applications (apps) compatible with our system.

## 2022-03-07 ENCOUNTER — APPOINTMENT (OUTPATIENT)
Dept: SURGERY | Facility: CLINIC | Age: 27
End: 2022-03-07
Payer: MEDICAID

## 2022-03-07 VITALS
HEIGHT: 68 IN | WEIGHT: 164 LBS | TEMPERATURE: 97.2 F | HEART RATE: 76 BPM | OXYGEN SATURATION: 98 % | SYSTOLIC BLOOD PRESSURE: 118 MMHG | DIASTOLIC BLOOD PRESSURE: 81 MMHG | BODY MASS INDEX: 24.86 KG/M2

## 2022-03-07 DIAGNOSIS — D64.9 ANEMIA, UNSPECIFIED: ICD-10-CM

## 2022-03-07 DIAGNOSIS — N60.42 MAMMARY DUCT ECTASIA OF LEFT BREAST: ICD-10-CM

## 2022-03-07 DIAGNOSIS — Z87.898 PERSONAL HISTORY OF OTHER SPECIFIED CONDITIONS: ICD-10-CM

## 2022-03-07 DIAGNOSIS — Z78.9 OTHER SPECIFIED HEALTH STATUS: ICD-10-CM

## 2022-03-07 DIAGNOSIS — N60.41 MAMMARY DUCT ECTASIA OF RIGHT BREAST: ICD-10-CM

## 2022-03-07 PROCEDURE — 99202 OFFICE O/P NEW SF 15 MIN: CPT

## 2022-03-08 PROBLEM — Z87.898 HISTORY OF MARIJUANA USE: Status: ACTIVE | Noted: 2022-03-07

## 2022-03-08 PROBLEM — D64.9 ANEMIA: Status: ACTIVE | Noted: 2022-03-07

## 2022-03-08 PROBLEM — Z78.9 CAFFEINE USE: Status: ACTIVE | Noted: 2022-03-07

## 2022-03-08 PROBLEM — N60.41 PERIDUCTAL MASTITIS OF RIGHT BREAST: Status: ACTIVE | Noted: 2022-03-08

## 2022-03-08 PROBLEM — N60.42 PERIDUCTAL MASTITIS OF LEFT BREAST: Status: ACTIVE | Noted: 2022-03-08

## 2022-03-08 RX ORDER — MEDROXYPROGESTERONE ACETATE 150 MG/ML
150 INJECTION, SUSPENSION INTRAMUSCULAR
Qty: 1 | Refills: 0 | Status: ACTIVE | COMMUNITY
Start: 2021-12-23

## 2022-03-08 RX ORDER — DICLOXACILLIN SODIUM 500 MG/1
500 CAPSULE ORAL
Qty: 40 | Refills: 0 | Status: ACTIVE | COMMUNITY
Start: 2022-02-22

## 2022-03-08 NOTE — REASON FOR VISIT
[Initial Evaluation] : an initial evaluation [FreeTextEntry1] : Bilateral breast pain and nipple discharge

## 2022-03-08 NOTE — HISTORY OF PRESENT ILLNESS
[de-identified] : The patient is referred for breast evaluation for complaints of bilateral breast pain and nipple discharge with an odor. This began about 3 weeks ago, and she was seen in the ED on , given oral dicloxacillin and referred for surgical evaluation. The discharge is brownish and intermittent but never black or bloody. She has no prior history of breast trauma or infection and no prior breast surgery or breast biopsies.  She has never had any breast imaging studies.\par \par Last GYN examination was one year ago, menarche was at age 9 and first pregnancy was at age 14. She is a /Ab1. She nursed for 2 months and uses Depo-Provera injections q.3 months for contraception, her next is scheduled for .

## 2022-03-08 NOTE — ASSESSMENT
[FreeTextEntry1] : Probable bilateral periductal mastitis. No surgical intervention is felt to be necessary at this time.  The nature of the problem was explained and she understands that this may be a chronic problem.  The patient should complete the antibiotics as prescribed and begin a regular regimen of warm moist compresses to the breasts. A bilateral breast sonogram has been requested for further evaluation and the nature of the problem was explained in full. All her questions were answered and she understands and agrees. She will proceed as outlined above, and return here in 3-4 weeks for reevaluation.

## 2022-03-08 NOTE — PHYSICAL EXAM
[Normal Thyroid] : the thyroid was normal [Normal Breath Sounds] : Normal breath sounds [Normal Heart Sounds] : normal heart sounds [Normal Rate and Rhythm] : normal rate and rhythm [de-identified] : healthy [de-identified] : no adenopathy [de-identified] : Small to moderate in size and symmetrical with a mixed fatty and glandular consistency. The nipples are partially inverted but to hebert easily, no nipple discharge could be expressed from either breast at this time. There there are no suspicious skin changes or palpable masses in either breast. The breasts are somewhat tender on examination, more so on the left than the right, but there is no focal tenderness. No axillary adenopathy bilaterally. Slight left axillary tenderness.

## 2022-04-04 ENCOUNTER — APPOINTMENT (OUTPATIENT)
Dept: SURGERY | Facility: CLINIC | Age: 27
End: 2022-04-04

## 2023-01-09 ENCOUNTER — EMERGENCY (EMERGENCY)
Facility: HOSPITAL | Age: 28
LOS: 0 days | Discharge: HOME | End: 2023-01-09
Attending: EMERGENCY MEDICINE | Admitting: EMERGENCY MEDICINE
Payer: MEDICAID

## 2023-01-09 VITALS
OXYGEN SATURATION: 99 % | RESPIRATION RATE: 18 BRPM | SYSTOLIC BLOOD PRESSURE: 108 MMHG | DIASTOLIC BLOOD PRESSURE: 65 MMHG | TEMPERATURE: 100 F | HEART RATE: 65 BPM

## 2023-01-09 DIAGNOSIS — Z90.49 ACQUIRED ABSENCE OF OTHER SPECIFIED PARTS OF DIGESTIVE TRACT: Chronic | ICD-10-CM

## 2023-01-09 PROCEDURE — 99283 EMERGENCY DEPT VISIT LOW MDM: CPT

## 2023-01-09 NOTE — ED PROVIDER NOTE - PHYSICAL EXAMINATION
Gen: Alert, NAD, well appearing  Head: NC, AT  ENT: normal hearing, patent oropharynx without erythema/exudate, uvula midline, tender in her peridontals gum area near tooth 30, no fluctuance or drainage noted  Skin: no rash, warm/dry  Neuro: AAOx3, no sensory/motor deficits, CN 2-12 grossly intact

## 2023-01-09 NOTE — CONSULT NOTE ADULT - ASSESSMENT
Patient is a 27y old  Female who presents with a chief complaint of pain lower left quadrant of mouth.     HPI: Pain 5 days onset with worsening symptoms.      PAST MEDICAL & SURGICAL HISTORY:  Anemia      S/P laparoscopic cholecystectomy       delivery delivered        ( -  ) heart valve replacement  (  - ) joint replacement  (  - ) pregnancy    MEDICATIONS  (STANDING): Denies    MEDICATIONS  (PRN): Denies      Allergies    No Known Allergies    Intolerances      Vital Signs Last 24 Hrs  T(C): 37.5 (2023 09:33), Max: 37.5 (2023 09:33)  T(F): 99.5 (2023 09:33), Max: 99.5 (2023 09:33)  HR: 65 (2023 09:33) (65 - 65)  BP: 108/65 (2023 09:33) (108/65 - 108/65)  BP(mean): --  RR: 18 (2023 09:33) (18 - 18)  SpO2: 99% (2023 09:33) (99% - 99%)    Parameters below as of 2023 09:33  Patient On (Oxygen Delivery Method): room air          EOE:  TMJ (  - ) clicks                     ( -  ) pops                     (  - ) crepitus             Mandible <<FROM>>             Facial bones and MOM <<grossly intact>>             (  - ) trismus             (  - ) lymphadenopathy             (  - ) swelling             (  - ) asymmetry             (  - ) palpation             (  - ) dyspnea             (  - ) dysphagia             (  - ) loss of consciousness    IOE:  permanent dentition: multiple carious teeth           hard/soft palate:  ( -  ) palatal torus, <<No pathology noted>>           tongue/FOM <<No pathology noted>>           labial/buccal mucosa <<No pathology noted>>           (  + ) percussion           (  + ) palpation           (  - ) swelling            (  - ) abscess           (  - ) sinus tract      *DENTAL RADIOGRAPHS: Periapical #19 carries+      *ASSESSMENT: Patient is a 27y old  Female who presents with a chief complaint of pain lower left quadrant of mouth. 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    PROCEDURE:   Risks, benefits, treatment options explained. Patient will contact her private dentist and insurance and schedule an appoint for consultation with [facility].  Anesthesia: 1 carpule bupivicaine hcl .5% with 1:200:000 epinephrine given via local infiltration.    Rx: Amoxicillin 500mg q8h 7 days; Ibuprofen 600mg q6h 5 days    RECOMMENDATIONS:  1) Antibiotics as prescribed  2) Dental F/U with outpatient dentist for comprehensive dental care.   3) If any difficulty swallowing/breathing, fever occur, return to ER.     Resident Name, pager #

## 2023-01-09 NOTE — ED PROVIDER NOTE - OBJECTIVE STATEMENT
Patient is here for dental pain. Pain is located left lower mouth for past 3 days. Has been taking ibuprofen with minimal relief. Was supposed to have wisdom teeth removed in that area 6 months ago but was unable to go. Denies any issues with breathing, fevers, chills, nausea/vomiting, SOB, changes in hearing, areas of fluctuance or drainage.

## 2023-01-09 NOTE — ED PROVIDER NOTE - ATTENDING CONTRIBUTION TO CARE
27 female to ED with dental pain to tooth #30.  3 days of pain and history of swelling in her wisdom teeth and was plan to have them removed.Exam as noted.

## 2023-01-12 DIAGNOSIS — K08.89 OTHER SPECIFIED DISORDERS OF TEETH AND SUPPORTING STRUCTURES: ICD-10-CM

## 2023-01-12 DIAGNOSIS — Z86.2 PERSONAL HISTORY OF DISEASES OF THE BLOOD AND BLOOD-FORMING ORGANS AND CERTAIN DISORDERS INVOLVING THE IMMUNE MECHANISM: ICD-10-CM

## 2023-01-12 DIAGNOSIS — K02.9 DENTAL CARIES, UNSPECIFIED: ICD-10-CM

## 2023-01-12 DIAGNOSIS — Z90.49 ACQUIRED ABSENCE OF OTHER SPECIFIED PARTS OF DIGESTIVE TRACT: ICD-10-CM

## 2023-07-13 NOTE — ED ADULT TRIAGE NOTE - MODE OF ARRIVAL
Last Clinic Visit: 11/28/2022  [facility]  Overdue for follow-up  Scheduling has been notified to contact the pt for appointment.  90 day marsha refill sent to the pharmacy - including instructions for patient to call the clinic and schedule an appointment.    Warnings Override History for carBAMazepine (CARBATROL) 200 MG 12 hr capsule [[id number]]    Overridden by Ashli Wilkinson MD on Jun 30, 2022 12:36 PM   Drug-Drug   1. CARBAMAZEPINE / LAMOTRIGINE [Level: Major]   Other Orders: lamoTRIgine (LAMICTAL) 200 MG tablet           Walk in

## 2024-01-23 NOTE — DISCHARGE NOTE OB - PAIN IN THE CALVES OF YOUR LEGS
Statement Selected Detail Level: Zone Plan: Sunscreen SPF 30 or greater and reapply q3h when outdoors.\\nHats, protective clothing, and seek shade when possible.

## 2024-04-01 NOTE — OB PROVIDER H&P - NS_ADMITREASON_OBGYN_ALL_OB
Will be able to able to describe prescribed diabetic medications and how to properly take these medications Labor at Term

## 2025-01-31 NOTE — ED ADULT NURSE NOTE - OBJECTIVE STATEMENT
Detail Level: Detailed
pt c/o of pain and lump on breast . stated there is d/c coming from breast with an odor. pt had fever last night

## 2025-03-30 NOTE — ED PROVIDER NOTE - CCCP TRG CHIEF CMPLNT
----- Message from Mario Alberto Au MD sent at 3/3/2017 10:03 AM CST -----  Fu with dione 4 wks   breast discharge 30-Mar-2025

## 2025-07-09 NOTE — ED ADULT TRIAGE NOTE - RESPIRATORY RATE (BREATHS/MIN)
Care Management Initial Assessment  7/9/2025 2:17 PM  If patient is discharged prior to next notation, then this note serves as note for discharge by case management.        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]  Procedure(s) (LRB):  RIGHT ANTERIOR TOTAL HIP REPLACEMENT (Right)  1 Day Post-Op    Patient Admission Status: Observation  Date Admitted to [place]: 7/8/25  RUR: No data recorded  Hospitalization in the last 30 days (Readmission):  No        Advance Care Planning:  Code Status: Full Code  Primary Healthcare Decision Maker: (P) Legal Next of Kin   Advance Directive: legal NOK     __________________________________________________________________________  Assessment:      07/09/25 1230   Service Assessment   Patient Orientation Alert and Oriented   Cognition Alert   History Provided By Patient   Primary Caregiver Self   Support Systems Family Members;Friends/Neighbors   Patient's Healthcare Decision Maker is: Legal Next of Kin   PCP Verified by CM Yes  (Dr. Kaur)   Last Visit to PCP Within last 3 months   Prior Functional Level Independent in ADLs/IADLs   Can patient return to prior living arrangement Yes   Ability to make needs known: Good   Family able to assist with home care needs: Yes   Financial Resources Medicare   Community Resources None   Social/Functional History   Lives With Alone   Type of Home Apartment   Home Layout One level   Home Access Level entry   Bathroom Shower/Tub Walk-in shower   Bathroom Toilet Handicap height   Bathroom Equipment Shower chair;Grab bars in shower;Toilet raiser   Home Equipment Cane   Prior Level of Assist for ADLs Independent   Prior Level of Assist for Homemaking Independent   Ambulation Assistance Independent   Prior Level of Assist for Transfers Independent   Active  Yes   Discharge Planning   Type of Residence House   Living Arrangements Alone  17